# Patient Record
Sex: MALE | Race: BLACK OR AFRICAN AMERICAN | NOT HISPANIC OR LATINO | Employment: FULL TIME | URBAN - METROPOLITAN AREA
[De-identification: names, ages, dates, MRNs, and addresses within clinical notes are randomized per-mention and may not be internally consistent; named-entity substitution may affect disease eponyms.]

---

## 2017-10-20 ENCOUNTER — HISTORICAL (OUTPATIENT)
Dept: CARDIOLOGY | Facility: HOSPITAL | Age: 47
End: 2017-10-20

## 2018-03-29 ENCOUNTER — HISTORICAL (OUTPATIENT)
Dept: ADMINISTRATIVE | Facility: HOSPITAL | Age: 48
End: 2018-03-29

## 2018-03-29 LAB
ABS NEUT (OLG): 1.7
ALBUMIN SERPL-MCNC: 4.4 GM/DL (ref 3.4–5)
ALBUMIN/GLOB SERPL: 1.47 {RATIO} (ref 1.5–2.5)
ALP SERPL-CCNC: 37 UNIT/L (ref 38–126)
ALT SERPL-CCNC: 20 UNIT/L (ref 7–52)
APPEARANCE, UA: CLEAR
AST SERPL-CCNC: 30 UNIT/L (ref 15–37)
BACTERIA #/AREA URNS AUTO: ABNORMAL /HPF
BILIRUB SERPL-MCNC: 0.8 MG/DL (ref 0.2–1)
BILIRUB UR QL STRIP: NEGATIVE MG/DL
BILIRUBIN DIRECT+TOT PNL SERPL-MCNC: 0.2 MG/DL (ref 0–0.5)
BUN SERPL-MCNC: 17 MG/DL (ref 7–18)
CALCIUM SERPL-MCNC: 8.8 MG/DL (ref 8.5–10)
CHLORIDE SERPL-SCNC: 106 MMOL/L (ref 98–107)
CHOLEST SERPL-MCNC: 121 MG/DL (ref 0–200)
CHOLEST/HDLC SERPL: 2.6 {RATIO}
CO2 SERPL-SCNC: 26 MMOL/L (ref 21–32)
COLOR UR: YELLOW
CREAT SERPL-MCNC: 1.07 MG/DL (ref 0.6–1.3)
CREAT/UREA NIT SERPL: 15.9
ERYTHROCYTE [DISTWIDTH] IN BLOOD BY AUTOMATED COUNT: 13.4 % (ref 11.5–17)
EST. AVERAGE GLUCOSE BLD GHB EST-MCNC: 120 MG/DL
GGT SERPL-CCNC: 14 UNIT/L (ref 5–85)
GLOBULIN SER-MCNC: 3 GM/DL (ref 1.2–3)
GLUCOSE (UA): NEGATIVE MG/DL
GLUCOSE SERPL-MCNC: 104 MG/DL (ref 74–106)
HBA1C MFR BLD: 5.8 % (ref 4.4–6.4)
HCT VFR BLD AUTO: 47.1 % (ref 42–52)
HDLC SERPL-MCNC: 46 MG/DL (ref 35–60)
HGB BLD-MCNC: 15.4 GM/DL (ref 14–18)
HGB UR QL STRIP: ABNORMAL UNIT/L
KETONES UR QL STRIP: NEGATIVE MG/DL
LDH SERPL-CCNC: 229 UNIT/L (ref 140–271)
LDLC SERPL CALC-MCNC: 56 MG/DL (ref 0–129)
LEUKOCYTE ESTERASE UR QL STRIP: NEGATIVE UNIT/L
LYMPHOCYTES # BLD AUTO: 2.3 X10(3)/MCL (ref 0.6–3.4)
LYMPHOCYTES NFR BLD AUTO: 49.3 % (ref 13–40)
MCH RBC QN AUTO: 30 PG (ref 27–31.2)
MCHC RBC AUTO-ENTMCNC: 33 GM/DL (ref 32–36)
MCV RBC AUTO: 92 FL (ref 80–94)
MONOCYTES # BLD AUTO: 0.6 X10(3)/MCL (ref 0–1.8)
MONOCYTES NFR BLD AUTO: 13.1 % (ref 0.1–24)
NEUTROPHILS NFR BLD AUTO: 37.6 % (ref 47–80)
NITRITE UR QL STRIP.AUTO: NEGATIVE
PH UR STRIP: 5 [PH]
PLATELET # BLD AUTO: 217 X10(3)/MCL (ref 130–400)
PMV BLD AUTO: 8.2 FL
POTASSIUM SERPL-SCNC: 4.2 MMOL/L (ref 3.5–5.1)
PROT SERPL-MCNC: 7.4 GM/DL (ref 6.4–8.2)
PROT UR QL STRIP: NEGATIVE MG/DL
PSA SERPL-MCNC: 1.37 NG/ML (ref 0–3.5)
RBC # BLD AUTO: 5.13 X10(6)/MCL (ref 4.7–6.1)
RBC #/AREA URNS HPF: ABNORMAL /HPF
SODIUM SERPL-SCNC: 137 MMOL/L (ref 136–145)
SP GR UR STRIP: 1.02
SQUAMOUS EPITHELIAL, UA: ABNORMAL /LPF
TRIGL SERPL-MCNC: 63 MG/DL (ref 30–150)
UROBILINOGEN UR STRIP-ACNC: 0.2 MG/DL
VLDLC SERPL CALC-MCNC: 12.6 MG/DL
WBC # SPEC AUTO: 4.6 X10(3)/MCL (ref 4.5–11.5)
WBC #/AREA URNS AUTO: ABNORMAL /[HPF]

## 2019-01-16 ENCOUNTER — HISTORICAL (OUTPATIENT)
Dept: ADMINISTRATIVE | Facility: HOSPITAL | Age: 49
End: 2019-01-16

## 2019-01-16 LAB
EST. AVERAGE GLUCOSE BLD GHB EST-MCNC: 117 MG/DL
HBA1C MFR BLD: 5.7 % (ref 4.4–6.4)

## 2019-08-18 ENCOUNTER — HOSPITAL ENCOUNTER (EMERGENCY)
Dept: HOSPITAL 57 - BURERS | Age: 49
Discharge: HOME | End: 2019-08-18
Payer: SELF-PAY

## 2019-08-18 DIAGNOSIS — N39.0: Primary | ICD-10-CM

## 2019-08-18 LAB
BACTERIA UR QL AUTO: (no result) HPF
PROT UR STRIP.AUTO-MCNC: 100 MG/DL
WBC UR QL AUTO: (no result) HPF (ref 0–3)

## 2019-08-18 PROCEDURE — 87186 SC STD MICRODIL/AGAR DIL: CPT

## 2019-08-18 PROCEDURE — 99283 EMERGENCY DEPT VISIT LOW MDM: CPT

## 2019-08-18 PROCEDURE — 81015 MICROSCOPIC EXAM OF URINE: CPT

## 2019-08-18 PROCEDURE — 87077 CULTURE AEROBIC IDENTIFY: CPT

## 2019-08-18 PROCEDURE — 87591 N.GONORRHOEAE DNA AMP PROB: CPT

## 2019-08-18 PROCEDURE — 87086 URINE CULTURE/COLONY COUNT: CPT

## 2019-08-18 PROCEDURE — 87491 CHLMYD TRACH DNA AMP PROBE: CPT

## 2019-08-18 PROCEDURE — 81003 URINALYSIS AUTO W/O SCOPE: CPT

## 2019-10-21 ENCOUNTER — HISTORICAL (OUTPATIENT)
Dept: ADMINISTRATIVE | Facility: HOSPITAL | Age: 49
End: 2019-10-21

## 2019-10-21 LAB
ALBUMIN SERPL-MCNC: 4.4 GM/DL (ref 3.4–5)
ALBUMIN/GLOB SERPL: 1.29 {RATIO} (ref 1.5–2.5)
ALP SERPL-CCNC: 37 UNIT/L (ref 38–126)
ALT SERPL-CCNC: 22 UNIT/L (ref 7–52)
AST SERPL-CCNC: 32 UNIT/L (ref 15–37)
BILIRUB SERPL-MCNC: 0.8 MG/DL (ref 0.2–1)
BILIRUBIN DIRECT+TOT PNL SERPL-MCNC: 0.2 MG/DL (ref 0–0.5)
BILIRUBIN DIRECT+TOT PNL SERPL-MCNC: 0.6 MG/DL
BUN SERPL-MCNC: 16 MG/DL (ref 7–18)
CALCIUM SERPL-MCNC: 9.2 MG/DL (ref 8.5–10)
CHLORIDE SERPL-SCNC: 104 MMOL/L (ref 98–107)
CHOLEST SERPL-MCNC: 156 MG/DL (ref 0–200)
CHOLEST/HDLC SERPL: 3.3 {RATIO}
CO2 SERPL-SCNC: 27 MMOL/L (ref 21–32)
CREAT SERPL-MCNC: 1.01 MG/DL (ref 0.6–1.3)
GLOBULIN SER-MCNC: 3.4 GM/DL (ref 1.2–3)
GLUCOSE SERPL-MCNC: 111 MG/DL (ref 74–106)
HDLC SERPL-MCNC: 47 MG/DL (ref 35–60)
LDLC SERPL CALC-MCNC: 87 MG/DL (ref 0–129)
POTASSIUM SERPL-SCNC: 4.6 MMOL/L (ref 3.5–5.1)
PROT SERPL-MCNC: 7.8 GM/DL (ref 6.4–8.2)
SODIUM SERPL-SCNC: 138 MMOL/L (ref 136–145)
TRIGL SERPL-MCNC: 80 MG/DL (ref 30–150)
VLDLC SERPL CALC-MCNC: 16 MG/DL

## 2020-06-03 ENCOUNTER — HISTORICAL (OUTPATIENT)
Dept: ADMINISTRATIVE | Facility: HOSPITAL | Age: 50
End: 2020-06-03

## 2020-06-03 LAB
ALBUMIN SERPL-MCNC: 4.4 GM/DL (ref 3.4–5)
ALBUMIN/GLOB SERPL: 1.29 {RATIO} (ref 1.5–2.5)
ALP SERPL-CCNC: 41 UNIT/L (ref 38–126)
ALT SERPL-CCNC: 22 UNIT/L (ref 7–52)
AST SERPL-CCNC: 32 UNIT/L (ref 15–37)
BILIRUB SERPL-MCNC: 0.8 MG/DL (ref 0.2–1)
BILIRUBIN DIRECT+TOT PNL SERPL-MCNC: 0.2 MG/DL (ref 0–0.5)
BILIRUBIN DIRECT+TOT PNL SERPL-MCNC: 0.6 MG/DL
BUN SERPL-MCNC: 13 MG/DL (ref 7–18)
CALCIUM SERPL-MCNC: 9.2 MG/DL (ref 8.5–10)
CHLORIDE SERPL-SCNC: 107 MMOL/L (ref 98–107)
CHOLEST SERPL-MCNC: 134 MG/DL (ref 0–200)
CHOLEST/HDLC SERPL: 3 {RATIO}
CO2 SERPL-SCNC: 29 MMOL/L (ref 21–32)
CREAT SERPL-MCNC: 1.1 MG/DL (ref 0.6–1.3)
CREAT UR-MCNC: 300 MG/DL
EST. AVERAGE GLUCOSE BLD GHB EST-MCNC: 120 MG/DL
GLOBULIN SER-MCNC: 3.4 GM/DL (ref 1.2–3)
GLUCOSE SERPL-MCNC: 95 MG/DL (ref 74–106)
HBA1C MFR BLD: 5.8 % (ref 4.4–6.4)
HDLC SERPL-MCNC: 44 MG/DL (ref 35–60)
LDLC SERPL CALC-MCNC: 75 MG/DL (ref 0–129)
MICROALBUMIN UR-MCNC: 30 MG/L
MICROALBUMIN/CREAT RATIO PNL UR: <30 MG/GM
POTASSIUM SERPL-SCNC: 4.1 MMOL/L (ref 3.5–5.1)
PROT SERPL-MCNC: 7.8 GM/DL (ref 6.4–8.2)
SODIUM SERPL-SCNC: 141 MMOL/L (ref 136–145)
TRIGL SERPL-MCNC: 80 MG/DL (ref 30–150)
VLDLC SERPL CALC-MCNC: 16 MG/DL

## 2021-02-01 ENCOUNTER — HISTORICAL (OUTPATIENT)
Dept: ADMINISTRATIVE | Facility: HOSPITAL | Age: 51
End: 2021-02-01

## 2021-02-01 LAB
ABS NEUT (OLG): 2.1 X10(3)/MCL (ref 2.1–9.2)
ALBUMIN SERPL-MCNC: 4.4 GM/DL (ref 3.4–5)
ALBUMIN/GLOB SERPL: 1.47 {RATIO} (ref 1.5–2.5)
ALP SERPL-CCNC: 40 UNIT/L (ref 38–126)
ALT SERPL-CCNC: 17 UNIT/L (ref 7–52)
APPEARANCE, UA: CLEAR
AST SERPL-CCNC: 25 UNIT/L (ref 15–37)
BACTERIA #/AREA URNS AUTO: ABNORMAL /HPF
BILIRUB SERPL-MCNC: 0.6 MG/DL (ref 0.2–1)
BILIRUB UR QL STRIP: NEGATIVE MG/DL
BILIRUBIN DIRECT+TOT PNL SERPL-MCNC: 0.2 MG/DL (ref 0–0.5)
BILIRUBIN DIRECT+TOT PNL SERPL-MCNC: 0.4 MG/DL
BUN SERPL-MCNC: 14 MG/DL (ref 7–18)
CALCIUM SERPL-MCNC: 9 MG/DL (ref 8.5–10.1)
CHLORIDE SERPL-SCNC: 106 MMOL/L (ref 98–107)
CHOLEST SERPL-MCNC: 110 MG/DL (ref 0–200)
CHOLEST/HDLC SERPL: 3.1 {RATIO}
CO2 SERPL-SCNC: 27 MMOL/L (ref 21–32)
COLOR UR: YELLOW
CREAT SERPL-MCNC: 1 MG/DL (ref 0.6–1.3)
CREAT UR-MCNC: 200 MG/DL
DEPRECATED CALCIDIOL+CALCIFEROL SERPL-MC: 13.9 NG/ML (ref 30–80)
ERYTHROCYTE [DISTWIDTH] IN BLOOD BY AUTOMATED COUNT: 14.2 % (ref 11.5–17)
EST. AVERAGE GLUCOSE BLD GHB EST-MCNC: 117 MG/DL
GLOBULIN SER-MCNC: 3 GM/DL (ref 1.2–3)
GLUCOSE (UA): NEGATIVE MG/DL
GLUCOSE SERPL-MCNC: 104 MG/DL (ref 74–106)
HBA1C MFR BLD: 5.7 % (ref 4.4–6.4)
HCT VFR BLD AUTO: 43.3 % (ref 42–52)
HDLC SERPL-MCNC: 36 MG/DL (ref 35–60)
HGB BLD-MCNC: 14.6 GM/DL (ref 14–18)
HGB UR QL STRIP: ABNORMAL UNIT/L
KETONES UR QL STRIP: NEGATIVE MG/DL
LDLC SERPL CALC-MCNC: 60 MG/DL (ref 0–129)
LEUKOCYTE ESTERASE UR QL STRIP: NEGATIVE UNIT/L
LYMPHOCYTES # BLD AUTO: 2.2 X10(3)/MCL (ref 0.6–3.4)
LYMPHOCYTES NFR BLD AUTO: 47.4 % (ref 13–40)
MCH RBC QN AUTO: 30.8 PG (ref 27–31.2)
MCHC RBC AUTO-ENTMCNC: 34 GM/DL (ref 32–36)
MCV RBC AUTO: 91 FL (ref 80–94)
MICROALBUMIN UR-MCNC: 30 MG/L
MICROALBUMIN/CREAT RATIO PNL UR: <30 MG/GM
MONOCYTES # BLD AUTO: 0.4 X10(3)/MCL (ref 0.1–1.3)
MONOCYTES NFR BLD AUTO: 8.7 % (ref 0.1–24)
NEUTROPHILS NFR BLD AUTO: 43.9 % (ref 47–80)
NITRITE UR QL STRIP.AUTO: NEGATIVE
PH UR STRIP: 5.5 [PH]
PLATELET # BLD AUTO: 244 X10(3)/MCL (ref 130–400)
PMV BLD AUTO: 8.7 FL (ref 9.4–12.4)
POTASSIUM SERPL-SCNC: 4.6 MMOL/L (ref 3.5–5.1)
PROT SERPL-MCNC: 7.4 GM/DL (ref 6.4–8.2)
PROT UR QL STRIP: NEGATIVE MG/DL
PSA SERPL-MCNC: 1.02 NG/ML (ref 0–3.5)
RBC # BLD AUTO: 4.74 X10(6)/MCL (ref 4.7–6.1)
RBC #/AREA URNS HPF: ABNORMAL /HPF
SODIUM SERPL-SCNC: 139 MMOL/L (ref 136–145)
SP GR UR STRIP: >1.03
SQUAMOUS EPITHELIAL, UA: ABNORMAL /LPF
TRIGL SERPL-MCNC: 62 MG/DL (ref 30–150)
TSH SERPL-ACNC: 1.1 MIU/ML (ref 0.35–4.94)
UROBILINOGEN UR STRIP-ACNC: 0.2 MG/DL
VLDLC SERPL CALC-MCNC: 12.4 MG/DL
WBC # SPEC AUTO: 4.7 X10(3)/MCL (ref 4.5–11.5)
WBC #/AREA URNS AUTO: ABNORMAL /[HPF]

## 2021-10-18 ENCOUNTER — HISTORICAL (OUTPATIENT)
Dept: ADMINISTRATIVE | Facility: HOSPITAL | Age: 51
End: 2021-10-18

## 2021-10-18 LAB
ALBUMIN SERPL-MCNC: 4.3 GM/DL (ref 3.4–5)
ALBUMIN/GLOB SERPL: 1.79 {RATIO} (ref 1.5–2.5)
ALP SERPL-CCNC: 39 UNIT/L (ref 38–126)
ALT SERPL-CCNC: 17 UNIT/L (ref 7–52)
AST SERPL-CCNC: 28 UNIT/L (ref 15–37)
BILIRUB SERPL-MCNC: 0.6 MG/DL (ref 0.2–1)
BUN SERPL-MCNC: 16 MG/DL (ref 7–18)
CALCIUM SERPL-MCNC: 9.2 MG/DL (ref 8.5–10.1)
CHLORIDE SERPL-SCNC: 104 MMOL/L (ref 98–107)
CHOLEST SERPL-MCNC: 145 MG/DL (ref 0–200)
CHOLEST/HDLC SERPL: 3 {RATIO}
CO2 SERPL-SCNC: 26 MMOL/L (ref 21–32)
CREAT SERPL-MCNC: 0.9 MG/DL (ref 0.6–1.3)
DEPRECATED CALCIDIOL+CALCIFEROL SERPL-MC: 24.2 NG/ML (ref 30–80)
EST CREAT CLEARANCE SER (OHS): 171.82 ML/MIN
EST. AVERAGE GLUCOSE BLD GHB EST-MCNC: 117 MG/DL
GLOBULIN SER-MCNC: 2.4 GM/DL (ref 1.2–3)
GLUCOSE SERPL-MCNC: 91 MG/DL (ref 74–106)
HBA1C MFR BLD: 5.7 % (ref 4.4–6.4)
HDLC SERPL-MCNC: 49 MG/DL (ref 35–60)
LDLC SERPL CALC-MCNC: 65 MG/DL (ref 0–129)
POTASSIUM SERPL-SCNC: 5 MMOL/L (ref 3.5–5.1)
PROT SERPL-MCNC: 6.7 GM/DL (ref 6.4–8.2)
SODIUM SERPL-SCNC: 138 MMOL/L (ref 136–145)
TRIGL SERPL-MCNC: 79 MG/DL (ref 30–150)
VLDLC SERPL CALC-MCNC: 15.8 MG/DL

## 2022-04-09 ENCOUNTER — HISTORICAL (OUTPATIENT)
Dept: ADMINISTRATIVE | Facility: HOSPITAL | Age: 52
End: 2022-04-09

## 2022-04-28 ENCOUNTER — HISTORICAL (OUTPATIENT)
Dept: ADMINISTRATIVE | Facility: HOSPITAL | Age: 52
End: 2022-04-28

## 2022-04-28 LAB — DEPRECATED CALCIDIOL+CALCIFEROL SERPL-MC: 20.6 NG/ML (ref 30–80)

## 2022-04-29 VITALS
SYSTOLIC BLOOD PRESSURE: 130 MMHG | DIASTOLIC BLOOD PRESSURE: 100 MMHG | WEIGHT: 275.81 LBS | HEIGHT: 73 IN | BODY MASS INDEX: 36.56 KG/M2 | WEIGHT: 272.5 LBS | BODY MASS INDEX: 36.11 KG/M2 | DIASTOLIC BLOOD PRESSURE: 88 MMHG | HEIGHT: 73 IN | SYSTOLIC BLOOD PRESSURE: 120 MMHG

## 2022-05-02 NOTE — HISTORICAL [facility] CERNER
This is a historical note converted from Georgie. Formatting and pictures may have been removed.  Please reference Georgie for original formatting and attached multimedia. Chief Complaint  CPX  History of Present Illness  Patient is here for ?his?annual wellness -?labs not done, fasting.?Denies any side effects to current medications.? Tolerating current medications and?feels that they are effective.? Denies change in social or family history.?  ?   HTN: He did not take his BP med last night. He occasionally checks his BP at home, and the diastolic is high sometimes.  ?   Patient presented to?[facility]?1/24/2021?with?sinusitis. ?He tested negative for Covid.? He was given a steroid injection and a prescription?of Augmentin.? After taking the medication for?3 to 4 days, he did not notice any improvement?so he stopped the medication.? He continues to have?sinus congestion and pressure?with a postnasal drip. Occasional headaches.  ?   He reports constipation. BM every 2-3 days. Stool is hard.  ?   Social Hx:??Working locally as a?.? Single, 6 children, 7 grands. No nicotine or etoh. No exercise.??Water:? 2-3 glasses/day,? Caff: occasional diet soda. He stopped his Sport Drinks one month ago.  ?   Family Hx:  Father dec 65 :? CAD, Dialysis  Mother 73 : enlarged heart, HTN  1 sister : HTN  2 brothers : both with HTN  ?  Surgical Hx:  Left Hand x3 procedures in 2018, Dr. Cottrell  ?   Specialists:  Card : Colin, recent stress test good per patient  Last Colonoscopy: no lifetime  ?   Vaccinations:  Influenza - declines  Shingles - +history of chicken pox as a child. He will return at a later date to receive due to him not feeling well today.  ?  Review of Systems  General:?? Patient reports energy level is ?fair. Denies weight change.??Denies fever,chills, night sweats, or weakness. ?Reports fatigue.  Integument:?? Denies any nevus changes, rashes, urticaria,??or sores.??Also denies itching or areas of  numbness.  HEENT:?? Denies vision changes or eye pain.??See HPI.  Cardiovascular:?? Denies chest pain, palpitations, dyspnea on exertion, orthopnea.  Respiratory:?? No cough, wheezing, shortness of breath, or sputum.  GI:?? Denies nausea, emesis, diarrhea, melena, hematochezia or abdominal pain  :?? No frequency, urgency, hematuria, discharge, or incontinence  MS:?? Denies myalgias, arthralgias, joint effusion, edema, or weakness  Neuro:?? No numbness in extremities, tingling, dizziness, or weakness  Psych:?? Denies anxiety, depression, suicidal or homicidal ideations, or irritability  Endo:?? Denies polyuria, polydipsia, polyphagia  Heme:?? No abnormal bleeding or bruising. No lymph node enlargement or pain.  ?  Physical Exam  Vitals & Measurements  HR:?73(Peripheral)? BP:?136/90?  HT:?185?cm? HT:?185.00?cm? WT:?126.3?kg? WT:?126.300?kg? BMI:?36.9?  General:?? Well-developed and??nourished, no apparent distress, alert and oriented??4.  Integument:?? Skin is intact with no erythema.??No pustules or vesicles.??No rash or scale. No Lymphadenopathy.??No urticaria.??No abnormal nevi.  HEENT:?? PERRLA, EOMI ; Bilateral EAC cerumen impaction. Turbinates enlarged and swollen;?+ erythema of pharynx with PND.  Neck:??Supple, no lymphadenopathy, no thyromegaly, no bruits, no jugular venous distention.  Cardiovascular:?? Regular rhythm and rate, no murmurs, radial and dorsal pedal pulses 2+ bilaterally.  Respiratory:?? Lungs clear to auscultation bilaterally, no wheezes, no crackles, no rhonchi.??Good air movement.  Abdomen:?? NABS, soft, nontender, no hepatosplenomegaly, no masses, no guarding or rebound.?  MS:?? No?CCE.  Neuro:?? CN II-XII intact_  Psych: Normal affect, patient calm, good historian, patient answers questions?appropriately. Good hygiene.  Heme:? No bruising or petechiae.  Assessment/Plan  1.?Wellness examination?Z00.00  ?Age appropriate wellness topics discussed. He?was encouraged to increase his water  intake and exercise (cardio and weight resistance).  Lab today: CBC, CMP, lipid panel, UA  Ordered:  CBC w/ Auto Diff, Routine collect, 02/01/21 9:13:00 CST, Blood, Order for future visit, Stop date 02/01/21 9:13:00 CST, Lab Collect, Wellness examination, 02/01/21 9:13:00 CST  Comprehensive Metabolic Panel, Routine collect, 02/01/21 9:13:00 CST, Blood, Order for future visit, Stop date 02/01/21 9:13:00 CST, Lab Collect, Wellness examination, 02/01/21 9:13:00 CST  Lab Collection Request, 02/01/21 9:13:00 CST, [facility] - [facility], 02/01/21 9:13:00 CST, Fatigue  Prediabetes  Prostate cancer screening  Wellness examination  Lipid Panel, Routine collect, 02/01/21 9:13:00 CST, Blood, Order for future visit, Stop date 02/01/21 9:13:00 CST, Lab Collect, Wellness examination, 02/01/21 9:13:00 CST  Preventative Health Care Est 40-64 years [id number] PC, Wellness examination, [facility] - [facility], 02/01/21 9:13:00 CST  Urinalysis no Reflex, Routine collect, Urine, Order for future visit, 02/01/21 9:13:00 CST, Stop date 02/01/21 9:13:00 CST, Nurse collect, Wellness examination  ?  2.?Colon cancer screening?Z12.11  ?Screening colonoscopy ordered.  Ordered:  External Referral, Colon Ca Screening, GI, 02/01/21 9:00:00 CST, Colon cancer screening  ?  3.?Prostate cancer screening?Z12.5  ?Lab today: PSA  Ordered:  Lab Collection Request, 02/01/21 9:13:00 CST, [facility] - [facility], 02/01/21 9:13:00 CST, Fatigue  Prediabetes  Prostate cancer screening  Wellness examination  Prostate Specific Antigen, Routine collect, 02/01/21 9:13:00 CST, Blood, Order for future visit, Stop date 02/01/21 9:13:00 CST, Lab Collect, Prostate cancer screening, 02/01/21 9:13:00 CST  ?  4.?Hypercholesteremia?E78.00  ?Lab today:?Lipid panel and CMP  Crestor 5 mg refilled #90x1  Ordered:  Clinic Follow up, *Est. 08/01/21 3:00:00 CDT, 6 month FU, 6 month FU, Order for future visit, Hypercholesteremia  Hypertension  Prediabetes, [facility]  ?  5.?Sinusitis?J32.9  ?Omnicef  300 mg every 12 hours?x10 days #20x0.  Astelin nasal spray?#1x0.  If symptoms fail to improve, he will notify us.  Ordered:  azelastine nasal, See Instructions, 1-2 spray(s) Nasal BID, # 1 EA, 0 Refill(s), Pharmacy: [id number], 185, cm, Height/Length Dosing, 02/01/21 8:30:00 CST, 126.3, kg, Weight Dosing, 02/01/21 8:30:00 CST  cefdinir, 300 mg = 1 cap(s), Oral, q12hr, X 10 day(s), # 20 cap(s), 0 Refill(s), Pharmacy: [id number], 185, cm, Height/Length Dosing, 02/01/21 8:30:00 CST, 126.3, kg, Weight Dosing, 02/01/21 8:30:00 CST  ?  6.?Hypertension?I10  ?Patient will check his blood pressure twice a day?and notify us if his blood pressure remains greater than 140/90.  He will also return to the office?in the morning after?taking his blood pressure medication the night before.  Labs today: CMP  Amlodipine 10 mg and valsartan 160 mg?refilled #90x1  Ordered:  amLODIPine, 10 mg = 1 tab(s), Oral, Daily, # 90 tab(s), 1 Refill(s), Pharmacy: [id number], 185, cm, Height/Length Dosing, 02/01/21 8:30:00 CST, 126.3, kg, Weight Dosing, 02/01/21 8:30:00 CST  valsartan, 160 mg = 1 tab(s), Oral, Daily, # 90 tab(s), 1 Refill(s), Pharmacy: [id number], 185, cm, Height/Length Dosing, 02/01/21 8:30:00 CST, 126.3, kg, Weight Dosing, 02/01/21 8:30:00 CST  Clinic Follow up, *Est. 08/01/21 3:00:00 CDT, 6 month FU, 6 month FU, Order for future visit, Hypercholesteremia  Hypertension  Prediabetes, [facility]  Office/Outpatient Visit Level 2 Established 33628 PC, Hypertension  OA (osteoarthritis)  Fatigue, [facility] - [facility], 02/01/21 9:13:00 CST  ?  7.?Prediabetes?R73.03  ?Lab today: A1c and MA  Ordered:  Clinic Follow up, *Est. 08/01/21 3:00:00 CDT, 6 month FU, 6 month FU, Order for future visit, Hypercholesteremia  Hypertension  Prediabetes, [facility]  Hemoglobin A1c, Routine collect, 02/01/21 9:13:00 CST, Blood, Order for future visit, Stop date 02/01/21 9:13:00 CST, Lab Collect, Prediabetes,  02/01/21 9:13:00 CST  Lab Collection Request, 02/01/21 9:13:00 CST, [facility] - [facility], 02/01/21 9:13:00 CST, Fatigue  Prediabetes  Prostate cancer screening  Wellness examination  Microalbumin Urine, Routine collect, Urine, Order for future visit, 02/01/21 9:13:00 CST, Stop date 02/01/21 9:13:00 CST, Nurse collect, Prediabetes  ?  8.?Obesity?E66.9  ?We discussed the importance of weight loss and the disease processes associated with?this problem. ?He was encouraged to follow a high-fiber diet?(25 to 35 g/day).? Low carbohydrate and sugar diet?with his history of prediabetes.? Low salt diet with his history of?hypertension.  ?  9.?Fatigue?R53.83  ?Lab today: Vitamin D, TSH  Ordered:  Lab Collection Request, 02/01/21 9:13:00 CST, [facility] - [facility], 02/01/21 9:13:00 CST, Fatigue  Prediabetes  Prostate cancer screening  Wellness examination  Office/Outpatient Visit Level 2 Established 17964 PC, Hypertension  OA (osteoarthritis)  Fatigue, [facility] - [facility], 02/01/21 9:13:00 CST  Thyroid Stimulating Hormone, Routine collect, 02/01/21 9:13:00 CST, Blood, Order for future visit, Stop date 02/01/21 9:13:00 CST, Lab Collect, Fatigue, 02/01/21 9:13:00 CST  Vitamin D, 25-Hydroxy Level, Routine collect, 02/01/21 9:13:00 CST, Blood, Order for future visit, Stop date 02/01/21 9:13:00 CST, Lab Collect, Fatigue, 02/01/21 9:13:00 CST  ?  10.?Bilateral impacted cerumen?H61.23  ?Ear wax softener drops daily for 2 weeks. If no improvement, he will schedule an OV for cerumen removal.  ?  11.?Constipation?K59.00  Increase water intake.?Increase fiber intake.  ?  12.?OA (osteoarthritis)?M19.90  ?Diclofenac gel?#100 g x 0  Ordered:  diclofenac topical, 1 mary kate, TOP, BID, # 100 gm, 0 Refill(s), Pharmacy: [id number], 185, cm, Height/Length Dosing, 02/01/21 8:30:00 CST, 126.3, kg, Weight Dosing, 02/01/21 8:30:00 CST  Office/Outpatient Visit Level 2 Established 12229 PC, Hypertension  OA (osteoarthritis)  Fatigue, [facility] - [facility],  02/01/21 9:13:00 CST  ?  Orders:  rosuvastatin, See Instructions, Take 1 tablet by mouth once daily, # 90 tab(s), 1 Refill(s), Pharmacy: [id number], 185, cm, Height/Length Dosing, 02/01/21 8:30:00 CST, 126.3, kg, Weight Dosing, 02/01/21 8:30:00 CST  Referrals  External Referral, Colon Ca Screening, GI, 02/01/21 9:00:00 CST, Colon cancer screening  Clinic Follow up, *Est. 08/01/21 3:00:00 CDT, 6 month FU, 6 month FU, Order for future visit, Hypercholesteremia  Hypertension  Prediabetes, [facility]   Problem List/Past Medical History  Ongoing  Allergic rhinitis  Hypercholesteremia  Hypertension  No-show for appointment  Obesity  Prediabetes  Prostate cancer screening  Vertigo  Wellness examination  Historical  HTN (hypertension)  PAD - Peripheral arterial disease  Procedure/Surgical History  Catheter placement in coronary artery(s) for coronary angiography, including intraprocedural injection(s) for coronary angiography, imaging supervision and interpretation; with left heart catheterization including intraprocedural injection(s) for left cass (10/20/2017)  Fluoroscopy of Multiple Coronary Arteries using Low Osmolar Contrast (10/20/2017)  Measurement of Cardiac Sampling and Pressure, Left Heart, Percutaneous Approach (10/20/2017)  Drainage of scrotal wall abscess (03/27/2015)  Incision and drainage of scrotum and tunica vaginalis (03/27/2015)  CLOSURE OF SKIN AND SUBCUTANEOUS TISSUE OTHER SITES (02/24/2013)  Simple repair of superficial wounds of scalp, neck, axillae, external genitalia, trunk and/or extremities (including hands and feet); 2.5 cm or less. (02/24/2013)  hand surgery   Medications  amlodipine 10 mg oral tablet, 10 mg= 1 tab(s), Oral, Daily, 1 refills  aspirin 81 mg oral tablet, CHEWABLE, 81 mg= 1 tab(s), Oral, Daily  Astelin 137 mcg/inh nasal spray, See Instructions  diclofenac 3% topical gel, 1 mary kate, TOP, BID  Omnicef 300 mg oral capsule, 300 mg= 1 cap(s), Oral, q12hr  rosuvastatin 5 mg oral  tablet, See Instructions, 1 refills  Toradol, 60 mg= 2 mL, IM, Once  valsartan 160 mg oral tablet, 160 mg= 1 tab(s), Oral, Daily, 1 refills  Zyrtec 10 mg oral tablet, 10 mg= 1 tab(s), Oral, Daily  Allergies  No Known Allergies  Social History  Abuse/Neglect  No, 02/01/2021  No, 06/03/2020  No, 01/04/2020  Alcohol - Denies Alcohol Use, 11/19/2012  Past, 01/04/2020  Employment/School  Employed, Work/School description: ., 10/21/2019  Substance Use - Denies Substance Abuse, 11/22/2012  Never, 01/04/2020  Tobacco - Denies Tobacco Use, 07/30/2012  Never (less than 100 in lifetime), No, 02/01/2021  Never (less than 100 in lifetime), No, 06/03/2020  Never (less than 100 in lifetime), No, 01/04/2020  Immunizations  Vaccine Date Status   diphtheria-tetanus toxoids 03/01/2013 Recorded   Health Maintenance  Health Maintenance  ???Pending?(in the next year)  ??? ??OverDue  ??? ? ? ?Aspirin Therapy for CVD Prevention due??02/20/15??and every 1??year(s)  ??? ? ? ?Influenza Vaccine due??10/01/20??and every 1??day(s)  ??? ??Due?  ??? ? ? ?Alcohol Misuse Screening due??01/02/21??and every 1??year(s)  ??? ? ? ?ADL Screening due??02/01/21??and every 1??year(s)  ??? ? ? ?Colorectal Screening due??02/01/21??Unknown Frequency  ??? ? ? ?Depression Screening due??02/01/21??Unknown Frequency  ??? ? ? ?Hypertension Management-Education due??02/01/21??and every 1??year(s)  ??? ? ? ?Zoster Vaccine due??02/01/21??Unknown Frequency  ??? ??Due In Future?  ??? ? ? ?Diabetes Screening not due until??06/03/21??and every 1??year(s)  ??? ? ? ?Hypertension Management-BMP not due until??06/03/21??and every 1??year(s)  ??? ? ? ?Obesity Screening not due until??01/01/22??and every 1??year(s)  ???Satisfied?(in the past 1 year)  ??? ??Satisfied?  ??? ? ? ?Blood Pressure Screening on??02/01/21.??Satisfied by Fallon Barnes NP  ??? ? ? ?Body Mass Index Check on??02/01/21.??Satisfied by Johan Gilliam  ??? ? ? ?Coronary Artery Disease  Maintenance-Lipid Lowering Therapy on??02/01/21.??Satisfied by Fallon Barnes NP  ??? ? ? ?Diabetes Screening on??06/03/20.??Satisfied by Mckayla Barnes  ??? ? ? ?Hypertension Management-Blood Pressure on??02/01/21.??Satisfied by Fallon Barnes NP  ??? ? ? ?Influenza Vaccine on??02/01/21.??Satisfied by Johan Gilliam  ??? ? ? ?Lipid Screening on??06/03/20.??Satisfied by Mckayla Barnes  ??? ? ? ?Obesity Screening on??02/01/21.??Satisfied by Johan Gilliam  ?      Patient condition discussed?in detail with nurse practitioner.??Agree with plan of care?and follow-up.  ?

## 2022-05-02 NOTE — HISTORICAL [facility] CERNER
This is a historical note converted from Georgie. Formatting and pictures may have been removed.  Please reference Georgie for original formatting and attached multimedia. Chief Complaint  6 month rc  History of Present Illness  Patient here for six-month follow-up.? Denies any side effects to current medications and?feels that they are effective.? Denies change in social or family history.? No new complaints.  Took his BP med last night but prior to that he had been out for about 4 days.? Denies HA.  Desires PSA, wasnt able to test after his CPX last year.  ?   CARD : Colin.  Review of Systems  General:??????????????? Patient reports energy level is good.??Denies fever,chills, night sweats, fatigue or weakness.  HEENT:?????????????????? Denies vision changes or eye pain.? No sore throat, ear pain, sinus pressure or discharge.  Cardiovascular:??? Denies chest pain, palpitations, dyspnea on exertion, orthopnea.  Respiratory:???????? No cough, wheezing, shortness of breath, or sputum.  GI:????????????????????????? Denies nausea, emesis, constipation, diarrhea, melena, hematochezia or abdominal pain  :??????????????????????? No frequency, urgency, hematuria, discharge, or incontinence  MS:?????????????????????pain in?knees and elbows. No joint effusion. ? Denies myalgias, arthralgias, joint effusion, edema, or weakness  Neuro:????????????????? No headaches, numbness in extremities, tingling, dizziness, or weakness  Psych:?????????????????? Denies anxiety, depression, suicidal or homicidal ideations, or irritability  Endo:??????????????????? Denies polyuria, polydipsia, polyphagia  Heme:????????????????? No abnormal bleeding or bruising. No lymph node enlargement or pain.  Physical Exam  Vitals & Measurements  HR:?84(Peripheral)? BP:?130/100?  HT:?185?cm? WT:?123.6?kg? BMI:?36.11?  130/94 on recheck.  General :?????Well-developed and? nourished, no apparent distress, alert and oriented ?4  HEENT:????? TMs and EACs within  normal limits,?nasal mucosa within normal limits?with no discharge,?oropharynx clear  Integument :????? Skin is intact with no erythema.? No pustules or vesicles.? No rash or scale. No Lymphadenopathy.? No urticaria.? No abnormal nevi  Neck :?????Supple, no lymphadenopathy, no thyromegaly, no bruits, no jugular venous distention  Cardiovascular :?????? Regular rhythm and rate, no murmurs, radial and dorsal pedal pulses 2+ bilaterally  Respiratory :??????Lungs clear to auscultation bilaterally, no wheezes, no crackles, no rhonchi.? Good air movement  Abdomen :?????? ?NABS, soft, nontender, no hepatosplenomegaly, no masses, no guarding or rebound????????????????????????  Ext:??????? No muscle tenderness, joints WNL, FROM, negative SLR, no?CCE  Neuro :? ?????? CN II-XII intact, reflexes 2+ throughout, no motor sensory deficits, negative?cerebellar? tests  Heme :?????? No bruising or petechiae?  Back:??????? no CVAT  Assessment/Plan  1.?Hypertension?I10  ?Refill amlodipine 10 mg?and?valsartan 160 mg. ?He is instructed to monitor his blood pressure at home and contact us if it is consistently greater than 140/90.? He is also to call us if he has any?symptoms?like headache or dizziness.  2.?Hypercholesteremia?E78.00  ?We will check CMP and lipids.? Refilled rosuvastatin for 6 months.  3.?Prediabetes?R73.03  ?We will check A1c and microalbumin  4.?Obesity?E66.9  ?Longer?term adverse consequences of obesity discussed with patient at length.? He strongly encouraged to lose weight.  5.?GERD (gastroesophageal reflux disease)?K21.9  Prescribed Prevacid 30 mg for 6 months.  Referrals  Clinic Follow up, *Est. 12/09/20 8:30:00 CST, CPX in 6 months, Order for future visit, GERD (gastroesophageal reflux disease), [facility]  Clinic Follow-up PRN, 06/03/20 11:59:00 CDT, [facility] - [facility], Future Order   Problem List/Past Medical History  Ongoing  Allergic rhinitis  Hypercholesteremia  Hypertension  No-show for  appointment  Obesity  Prediabetes  Vertigo  Wellness examination  Historical  HTN (hypertension)  PAD - Peripheral arterial disease  Procedure/Surgical History  Catheter placement in coronary artery(s) for coronary angiography, including intraprocedural injection(s) for coronary angiography, imaging supervision and interpretation; with left heart catheterization including intraprocedural injection(s) for left cass (10/20/2017)  Fluoroscopy of Multiple Coronary Arteries using Low Osmolar Contrast (10/20/2017)  Measurement of Cardiac Sampling and Pressure, Left Heart, Percutaneous Approach (10/20/2017)  Drainage of scrotal wall abscess (03/27/2015)  Incision and drainage of scrotum and tunica vaginalis (03/27/2015)  CLOSURE OF SKIN AND SUBCUTANEOUS TISSUE OTHER SITES (02/24/2013)  Simple repair of superficial wounds of scalp, neck, axillae, external genitalia, trunk and/or extremities (including hands and feet); 2.5 cm or less. (02/24/2013)  hand surgery   Medications  amlodipine 10 mg oral tablet, 10 mg= 1 tab(s), Oral, Daily, 1 refills  aspirin 81 mg oral tablet, CHEWABLE, 81 mg= 1 tab(s), Oral, Daily  meclizine 25 mg oral tablet, 25 mg= 1 tab(s), Oral, QID, 1 refills  Prevacid 30 mg oral DR capsule, 30 mg= 1 cap(s), Oral, BID, 1 refills  rosuvastatin 5 mg oral tablet, 5 mg= 1 tab(s), Oral, Daily, 1 refills  Toradol, 60 mg= 2 mL, IM, Once  valsartan 160 mg oral tablet, 160 mg= 1 tab(s), Oral, Daily, 1 refills  Allergies  No Known Allergies  Social History  Abuse/Neglect  No, 06/03/2020  No, 01/04/2020  Alcohol - Denies Alcohol Use, 11/19/2012  Past, 01/04/2020  Employment/School  Employed, Work/School description: ., 10/21/2019  Substance Use - Denies Substance Abuse, 11/22/2012  Never, 01/04/2020  Tobacco - Denies Tobacco Use, 07/30/2012  Never (less than 100 in lifetime), No, 06/03/2020  Never (less than 100 in lifetime), No, 01/04/2020  Immunizations  Vaccine Date Status   diphtheria-tetanus toxoids  03/01/2013 Recorded   Health Maintenance  Health Maintenance  ???Pending?(in the next year)  ??? ??OverDue  ??? ? ? ?Diabetes Screening due??and every?  ??? ? ? ?Aspirin Therapy for CVD Prevention due??02/20/15??and every 1??year(s)  ??? ? ? ?Alcohol Misuse Screening due??01/01/20??and every 1??year(s)  ??? ??Due?  ??? ? ? ?ADL Screening due??06/12/20??and every 1??year(s)  ??? ? ? ?Depression Screening due??06/12/20??and every?  ??? ? ? ?Hypertension Management-Education due??06/12/20??and every 1??year(s)  ??? ??Due In Future?  ??? ? ? ?Obesity Screening not due until??01/01/21??and every 1??year(s)  ??? ? ? ?Blood Pressure Screening not due until??06/03/21??and every 1??year(s)  ??? ? ? ?Body Mass Index Check not due until??06/03/21??and every 1??year(s)  ??? ? ? ?Hypertension Management-Blood Pressure not due until??06/03/21??and every 1??year(s)  ??? ? ? ?Hypertension Management-BMP not due until??06/03/21??and every 1??year(s)  ???Satisfied?(in the past 1 year)  ??? ??Satisfied?  ??? ? ? ?Blood Pressure Screening on??06/03/20.??Satisfied by Price Trinh  ??? ? ? ?Body Mass Index Check on??06/03/20.??Satisfied by Price Trinh  ??? ? ? ?Coronary Artery Disease Maintenance-Lipid Lowering Therapy on??06/03/20.??Satisfied by Ayla Conklin ?PADMINI MARS  ??? ? ? ?Diabetes Screening on??06/03/20.??Satisfied by Mckayla Barnes  ??? ? ? ?Hypertension Management-BMP on??06/03/20.??Satisfied by Mckayla Barnes  ??? ? ? ?Influenza Vaccine on??10/21/19.??Satisfied by Loulou Falcon LPN  ??? ? ? ?Lipid Screening on??06/03/20.??Satisfied by Mckayla Barnes  ??? ? ? ?Obesity Screening on??06/03/20.??Satisfied by Price Trinh  ?

## 2022-05-02 NOTE — HISTORICAL [facility]ER
This is a historical note converted from Georgie. Formatting and pictures may have been removed.  Please reference Georgie for original formatting and attached multimedia. Chief Complaint  CPX (FASTING)  History of Present Illness  Patient presents for annual wellness exam.? Denies any change in social history or family history.? Reports to be tolerating prescribed medicines well. Denies any side effects.??  Working in [place], 30 and 7, as a?.? Single, 6 children, 7 grands. No nicotine or etoh. No exercise.  Father dec 65 :? CAD, Dialysis  Mother 72 : enlarged heart, HTN  1 sister : HTN  2 brothers : both with HTN  ?  Card : Colin  Review of Systems  General:??????????????? Patient reports energy level is good. Denies weight change. ?Denies fever,chills, night sweats, fatigue or weakness.  Integument:???????? Denies any nevus changes, rashes, urticaria, ?or sores.? Also denies itching or areas of numbness.  HEENT:?????????????????? Denies vision changes or eye pain.? No sore throat, ear pain, sinus pressure or discharge.  Cardiovascular:???missed doses of HTN meds this week.?  Denies chest pain, palpitations, dyspnea on exertion, orthopnea.  Respiratory:???????? No cough, wheezing, shortness of breath, or sputum.  GI:????????????????????????? Denies nausea, emesis, constipation, diarrhea, melena, hematochezia or abdominal pain  :??????????????????????? No frequency, urgency, hematuria, discharge, or incontinence  MS:?????????????????????? Denies myalgias, arthralgias, joint effusion, edema, or weakness  Neuro:????????????????? No headaches, numbness in extremities, tingling, dizziness, or weakness  Psych:?????????????????? Denies anxiety, depression, suicidal or homicidal ideations, or irritability  Endo:??????????????????? Denies polyuria, polydipsia, polyphagia  Heme:????????????????? No abnormal bleeding or bruising. No lymph node enlargement or pain.  Physical Exam  Vitals &  Measurements  HR:?75(Peripheral)? BP:?160/98?  HT:?185?cm? WT:?127.1?kg? BMI:?37.14?  General :?????Well-developed and? nourished, no apparent distress, alert and oriented ?4  Integument :????? Skin is intact with no erythema.? No pustules or vesicles.? No rash or scale. No Lymphadenopathy.? No urticaria.? No abnormal nevi  HEENT :?????VANESSA, EOMI ; TMs and EACs clear, normal turbinates with no erythema, normal mucosa, no sinus tenderness; no erythema or exudate of mouth or pharynx  Neck :?????Supple, no lymphadenopathy, no thyromegaly, no bruits, no jugular venous distention  Cardiovascular :?????? Regular rhythm and rate, no murmurs, radial and dorsal pedal pulses 2+ bilaterally  Respiratory :??????Lungs clear to auscultation bilaterally, no wheezes, no crackles, no rhonchi.? Good air movement  Abdomen :?????? ?NABS, soft, nontender, no hepatosplenomegaly, no masses, no guarding or rebound??????????????????????????  MS :??????? No muscle tenderness, joints WNL, FROM, negative SLR, no?CCE  Neuro :? ?????? CN II-XII intact, reflexes 2+ throughout, no motor sensory deficits, negative?cerebellar? tests  Psych :??????Normal affect, patient calm, good historian, patient answers questions??? appropriately.????Good hygiene? ??  Heme :?????? No bruising or petechiae?  Assessment/Plan  1.?Wellness examination?Z00.00  ?Age-appropriate wellness topics discussed.? Encouraged to lose weight and increase exercise.  2.?Hypertension?I10  ?Not controlled. ?Will increase valsartan to 160 mg.??Continue amlodipine 10 mg.? He is advised to call if he has severe headaches, chest pain, or dizzy spells  3.?Hypercholesteremia?E78.00  ?We will check CMP and lipids.  4.?Prediabetes?R73.03  Diet discussed in detail  Referrals  Clinic Follow up, *Est. 04/21/20 9:30:00 CDT, Order for future visit, Hypercholesteremia, [facility]   Problem List/Past Medical History  Ongoing  Allergic  rhinitis  Hypercholesteremia  Hypertension  Obesity  Prediabetes  Vertigo  Wellness examination  Historical  HTN (hypertension)  PAD - Peripheral arterial disease  Procedure/Surgical History  Catheter placement in coronary artery(s) for coronary angiography, including intraprocedural injection(s) for coronary angiography, imaging supervision and interpretation; with left heart catheterization including intraprocedural injection(s) for left cass (10/20/2017)  Fluoroscopy of Multiple Coronary Arteries using Low Osmolar Contrast (10/20/2017)  Measurement of Cardiac Sampling and Pressure, Left Heart, Percutaneous Approach (10/20/2017)  Drainage of scrotal wall abscess (03/27/2015)  Incision and drainage of scrotum and tunica vaginalis (03/27/2015)  CLOSURE OF SKIN AND SUBCUTANEOUS TISSUE OTHER SITES (02/24/2013)  Simple repair of superficial wounds of scalp, neck, axillae, external genitalia, trunk and/or extremities (including hands and feet); 2.5 cm or less. (02/24/2013)  hand surgery   Medications  amlodipine 10 mg oral tablet, See Instructions, 1 refills  aspirin 81 mg oral tablet, CHEWABLE, 81 mg= 1 tab(s), Oral, Daily  meclizine 25 mg oral tablet, 25 mg= 1 tab(s), Oral, QID, 1 refills  rosuvastatin 5 mg oral tablet, 5 mg= 1 tab(s), Oral, Daily, 1 refills  Toradol, 60 mg= 2 mL, IM, Once  valsartan 160 mg oral tablet, 160 mg= 1 tab(s), Oral, Daily, 1 refills  Allergies  No Known Allergies  Social History  Abuse/Neglect  No, 10/21/2019  Alcohol - Denies Alcohol Use, 11/19/2012  Current, 1-2 times per year, 03/29/2018  Current, 1-2 times per year, 08/29/2016  Current, Liquor, 1-2 times per month, 2 drinks/episode average., 03/27/2015  Employment/School  Employed, Work/School description: ., 10/21/2019  Substance Use - Denies Substance Abuse, 11/22/2012  Past, 10/20/2017  Tobacco - Denies Tobacco Use, 07/30/2012  Never (less than 100 in lifetime), N/A, 10/21/2019  Never (less than 100 in lifetime), N/A,  01/16/2019  Never smoker, 08/29/2016  Immunizations  Vaccine Date Status   diphtheria-tetanus toxoids 03/01/2013 Recorded   Health Maintenance  Health Maintenance  ???Pending?(in the next year)  ??? ??OverDue  ??? ? ? ?Coronary Artery Disease Maintenance-Antiplatelet Agent Prescribed due??and every?  ??? ? ? ?Coronary Artery Disease Maintenance-Lipid Lowering Therapy due??and every?  ??? ? ? ?Diabetes Screening due??and every?  ??? ? ? ?Aspirin Therapy for CVD Prevention due??02/20/15??and every 1??year(s)  ??? ? ? ?Alcohol Misuse Screening due??01/01/19??and every 1??year(s)  ??? ??Due?  ??? ? ? ?ADL Screening due??10/24/19??and every 1??year(s)  ??? ? ? ?Depression Screening due??10/24/19??and every?  ??? ? ? ?Hypertension Management-Education due??10/24/19??and every 1??year(s)  ??? ? ? ?Influenza Vaccine due??10/24/19??and every?  ??? ??Due In Future?  ??? ? ? ?Obesity Screening not due until??01/01/20??and every 1??year(s)  ??? ? ? ?Smoking Cessation (Coronary Artery Disease) not due until??03/28/20??and every 2??year(s)  ??? ? ? ?Blood Pressure Screening not due until??10/20/20??and every 1??year(s)  ??? ? ? ?Body Mass Index Check not due until??10/20/20??and every 1??year(s)  ??? ? ? ?Hypertension Management-Blood Pressure not due until??10/20/20??and every 1??year(s)  ??? ? ? ?Hypertension Management-BMP not due until??10/20/20??and every 1??year(s)  ???Satisfied?(in the past 1 year)  ??? ??Satisfied?  ??? ? ? ?Blood Pressure Screening on??10/21/19.??Satisfied by Loulou Falcon LPN  ??? ? ? ?Body Mass Index Check on??10/21/19.??Satisfied by Loulou Falcon LPN  ??? ? ? ?Coronary Artery Disease Maintenance-Lipid Lowering Therapy on??10/21/19.??Satisfied by Ayla Conklin ?PADMINI MARS  ??? ? ? ?Diabetes Screening on??10/21/19.??Satisfied by Mckayla Barnes  ??? ? ? ?Hypertension Management-BMP on??10/21/19.??Satisfied by Mckayla Barnes  ??? ? ? ?Influenza Vaccine on??10/21/19.??Satisfied by Ezekiel SHIPLEY,  Loulou  ??? ? ? ?Lipid Screening on??10/21/19.??Satisfied by Mckayla Barnes  ??? ? ? ?Obesity Screening on??10/21/19.??Satisfied by Loulou Falcno LPN  ?

## 2022-05-02 NOTE — HISTORICAL [facility]
This is a historical note converted from Georgie. Formatting and pictures may have been removed.  Please reference Georgie for original formatting and attached multimedia. Chief Complaint  Went to ER at [facility] 1/4/18, had abcess drained  History of Present Illness  Patient with abscess of left calf for 2 weeks. Went to [facility] on 1-4-19, they I&D and packed it. Finished 10 days of anibiotic, likely Clinda because was QID. No fever.  Review of Systems  See HPI. ?Denies?polyuria, polydipsia, or?polyphagia  Physical Exam  Vitals & Measurements  HR:?84(Peripheral)? BP:?120/88?  HT:?185?cm? HT:?185?cm? WT:?124?kg? WT:?124?kg? BMI:?36.23?  General:?Patient alert and oriented, NAD on room air  EXT:?Tender area with mild induration left calf with?central area of incision.? Small amount of?beau exudate?at incision site.? Erythema?2 cm?in diameter from?incision site. ?No LAD  CV RRR, no murmur  Lungs: CTA B  ABD: Benign  A1c: 5.7  Assessment/Plan  1.?Abscess?L02.91  Prescribe Bactrim DS twice daily for 10 days. ?Patient to call us if?symptoms worsen or if they fail to resolve.  2.?Prediabetes?R73.03  ?Somewhat improved.? Encouraged and?instructed on diet.   Problem List/Past Medical History  Ongoing  Allergic rhinitis  Hypercholesteremia  Hypertension  Obesity  Prediabetes  Vertigo  Wellness examination  Historical  HTN (hypertension)  PAD - Peripheral arterial disease  Procedure/Surgical History  hand surgery   Medications  amlodipine 10 mg oral tablet, See Instructions  aspirin 81 mg oral tablet, CHEWABLE, 81 mg= 1 tab(s), Oral, Daily  Bactrim  mg-160 mg oral tablet, 1 tab(s), Oral, BID  IRBESARTAN 75 MG TABLET, 75 mg= 1 tab(s), Oral, Daily  meclizine 25 mg oral tablet, 25 mg= 1 tab(s), Oral, QID, 1 refills  MUPIROCIN 2% OINTMENT, TOP, TID,? ?Not taking  Nasonex 50 mcg/inh nasal spray, 2 spray(s), Nasal, Daily, 1 refills,? ?Not taking  rosuvastatin 5 mg oral tablet, 5 mg= 1 tab(s), Oral, Daily  SULFAMETHOXAZOLE-TMP DS  TABLET, 1 tab(s), Oral, BID,? ?Not taking  Toradol, 60 mg= 2 mL, IM, Once  VALSARTAN 80 MG TABLET, 80 mg= 1 tab(s), Oral, Daily  Allergies  No Known Allergies  Social History  Alcohol - Denies Alcohol Use, 11/19/2012  Current, 1-2 times per year, 03/29/2018  Current, 1-2 times per year, 08/29/2016  Current, Liquor, 1-2 times per month, 2 drinks/episode average., 03/27/2015  Substance Abuse - Denies Substance Abuse, 11/22/2012  Past, 10/20/2017  Tobacco - Denies Tobacco Use, 07/30/2012  Never (less than 100 in lifetime), N/A, 01/16/2019  Never smoker, 08/29/2016  Immunizations  Vaccine Date Status   diphtheria-tetanus toxoids 03/01/2013 Recorded   Health Maintenance  Health Maintenance  ???Pending?(in the next year)  ??? ??OverDue  ??? ? ? ?Coronary Artery Disease Maintenance-Antiplatelet Agent Prescribed due??and every?  ??? ? ? ?Coronary Artery Disease Maintenance-Lipid Lowering Therapy due??and every?  ??? ? ? ?Diabetes Screening due??and every?  ??? ? ? ?Aspirin Therapy for CVD Prevention due??02/20/15??and every 1??year(s)  ??? ? ? ?Smoking Cessation due??07/04/15??and every 1??year(s)  ??? ??Due?  ??? ? ? ?ADL Screening due??01/21/19??and every 1??year(s)  ??? ? ? ?Alcohol Misuse Screening due??01/21/19??and every 1??year(s)  ??? ? ? ?Depression Screening due??01/21/19??and every?  ??? ? ? ?Hypertension Management-Education due??01/21/19??and every 1??year(s)  ??? ??Due In Future?  ??? ? ? ?Hypertension Management-BMP not due until??03/29/19??and every 1??year(s)  ??? ? ? ?Blood Pressure Screening not due until??01/16/20??and every 1??year(s)  ??? ? ? ?Body Mass Index Check not due until??01/16/20??and every 1??year(s)  ??? ? ? ?Hypertension Management-Blood Pressure not due until??01/16/20??and every 1??year(s)  ??? ? ? ?Obesity Screening not due until??01/16/20??and every 1??year(s)  ???Satisfied?(in the past 1 year)  ??? ??Satisfied?  ??? ? ? ?Blood Pressure Screening on??01/16/19.??Satisfied by Bandar LEIVA,  Yanelis AMBRIZ  ??? ? ? ?Body Mass Index Check on??01/16/19.??Satisfied by Yanelis Portillo CMA  ??? ? ? ?Diabetes Screening on??01/16/19.??Satisfied by Casandra Retana  ??? ? ? ?Hypertension Management-Blood Pressure on??01/16/19.??Satisfied by Yanelis Portillo CMA  ??? ? ? ?Influenza Vaccine on??01/16/19.??Satisfied by Yanelis Portillo CMA  ??? ? ? ?Lipid Screening on??03/29/18.??Satisfied by Oh Lang  ??? ? ? ?Obesity Screening on??01/16/19.??Satisfied by Yanelis Portillo CMA  ?  ?  Lab Results  Test Name Test Result Date/Time   Hgb A1c 5.7 % 01/16/2019 15:43 CST   Hgb A1c 5.8 % 03/29/2018 08:22 CDT

## 2022-05-02 NOTE — HISTORICAL [facility]
This is a historical note converted from Georgie. Formatting and pictures may have been removed.  Please reference Georgie for original formatting and attached multimedia. Chief Complaint  6 MTH RECHECK  History of Present Illness  Patient presents for 6-month follow-up visit.  Took BP medication last night. BP usually normal at home.  No nicotine or etoh.? Working in [place], . Physically demanding at times.  Declines Flu vaccine. Not planning on getting COVID vaccine.  Review of Systems  General:??????????????? Patient reports energy level is good.??Denies fever,chills, night sweats, fatigue or weakness.  HEENT:?????????????????? Denies vision changes or eye pain.? No sore throat, ear pain, sinus pressure or discharge.  Cardiovascular:??? Denies chest pain, palpitations, dyspnea on exertion, orthopnea.  Respiratory:???????? No cough, wheezing, shortness of breath, or sputum.  GI:????????????????????????? Denies nausea, emesis, constipation, diarrhea, melena, hematochezia or abdominal pain  :??????????????????????? No frequency, urgency, hematuria, discharge, or incontinence  MS:?????????????????????? Denies myalgias, arthralgias, joint effusion, edema, or weakness  Neuro:????????????????? No headaches, numbness in extremities, tingling, dizziness, or weakness  Psych:?????????????????? Denies anxiety, depression, suicidal or homicidal ideations, or irritability  Endo:??????????????????? Denies polyuria, polydipsia, polyphagia  Heme:????????????????? No abnormal bleeding or bruising. No lymph node enlargement or pain.  Physical Exam  Vitals & Measurements  HR:?64(Peripheral)? BP:?120/88?  HT:?185.00?cm? HT:?185?cm? WT:?125.100?kg? WT:?125.1?kg? BMI:?36.55?  General :?????Well-developed and? nourished, no apparent distress, alert and oriented ?4  HEENT:????? TMs and EACs within normal limits,?nasal mucosa within normal limits?with no discharge,?oropharynx clear  Integument :????? Skin is intact with no  erythema.? No pustules or vesicles.? No rash or scale. No Lymphadenopathy.? No urticaria.? No abnormal nevi  Neck :?????Supple, no lymphadenopathy, no thyromegaly, no bruits, no jugular venous distention  Cardiovascular :?????? Regular rhythm and rate, no murmurs, radial and dorsal pedal pulses 2+ bilaterally  Respiratory :??????Lungs clear to auscultation bilaterally, no wheezes, no crackles, no rhonchi.? Good air movement  Abdomen :?????? ?NABS, soft, nontender, no hepatosplenomegaly, no masses, no guarding or rebound????????????????????????  Ext:??????? No muscle tenderness, joints WNL, FROM, negative SLR, no?CCE  Neuro :? ?????? CN II-XII intact, reflexes 2+ throughout, no motor sensory deficits, negative?cerebellar? tests  Heme :?????? No bruising or petechiae?  Back:??????? no CVAT  Assessment/Plan  1.?Hypertension?I10  ?Controlled.? Amlodipine 10 mg and valsartan 160 mg refilled for 6 months.  2.?Hypercholesteremia?E78.00  ?We will check a CMP and lipids. ?Refilled rosuvastatin 5 mg for 6 months.  3.?Prediabetes?R73.03  ?We will check an A1c.  4.?Vitamin D deficiency?E55.9  ?Check a vitamin D level.  Referrals  Clinic Follow up, *Est. 04/19/22 8:15:00 CDT, CPX in 6 months, Order for future visit, Vitamin D deficiency, [facility]   Problem List/Past Medical History  Ongoing  Allergic rhinitis  Hypercholesteremia  Hypertension  No-show for appointment  Obesity  Prediabetes  Prostate cancer screening  Vertigo  Wellness examination  Historical  HTN (hypertension)  PAD - Peripheral arterial disease  Procedure/Surgical History  Catheter placement in coronary artery(s) for coronary angiography, including intraprocedural injection(s) for coronary angiography, imaging supervision and interpretation; with left heart catheterization including intraprocedural injection(s) for left cass (10/20/2017)  Fluoroscopy of Multiple Coronary Arteries using Low Osmolar Contrast (10/20/2017)  Measurement of Cardiac Sampling and  Pressure, Left Heart, Percutaneous Approach (10/20/2017)  Drainage of scrotal wall abscess (03/27/2015)  Incision and drainage of scrotum and tunica vaginalis (03/27/2015)  CLOSURE OF SKIN AND SUBCUTANEOUS TISSUE OTHER SITES (02/24/2013)  Simple repair of superficial wounds of scalp, neck, axillae, external genitalia, trunk and/or extremities (including hands and feet); 2.5 cm or less. (02/24/2013)  hand surgery   Medications  amlodipine 10 mg oral tablet, 10 mg= 1 tab(s), Oral, Daily, 1 refills  aspirin 81 mg oral tablet, CHEWABLE, 81 mg= 1 tab(s), Oral, Daily  rosuvastatin 5 mg oral tablet, See Instructions, 1 refills  Toradol, 60 mg= 2 mL, IM, Once  valsartan 160 mg oral tablet, See Instructions, 1 refills  Allergies  No Known Allergies  Social History  Abuse/Neglect  No, 10/18/2021  No, 02/01/2021  No, 06/03/2020  No, 01/04/2020  Alcohol - Denies Alcohol Use, 11/19/2012  Past, 01/04/2020  Employment/School  Employed, Work/School description: ., 10/21/2019  Substance Use - Denies Substance Abuse, 11/22/2012  Never, 01/04/2020  Tobacco - Denies Tobacco Use, 07/30/2012  Never (less than 100 in lifetime), No, 10/18/2021  Never (less than 100 in lifetime), No, 02/01/2021  Never (less than 100 in lifetime), No, 06/03/2020  Never (less than 100 in lifetime), No, 01/04/2020  Immunizations  Vaccine Date Status   diphtheria-tetanus toxoids 03/01/2013 Recorded   Health Maintenance  Health Maintenance  ???Pending?(in the next year)  ??? ??OverDue  ??? ? ? ?Aspirin Therapy for CVD Prevention due??02/20/15??and every 1??year(s)  ??? ? ? ?Alcohol Misuse Screening due??01/02/21??and every 1??year(s)  ??? ??Due?  ??? ? ? ?ADL Screening due??10/31/21??and every 1??year(s)  ??? ? ? ?Depression Screening due??10/31/21??Unknown Frequency  ??? ? ? ?Hypertension Management-Education due??10/31/21??and every 1??year(s)  ??? ? ? ?Zoster Vaccine due??10/31/21??Unknown Frequency  ??? ??Due In Future?  ??? ? ? ?Obesity  Screening not due until??01/01/22??and every 1??year(s)  ??? ? ? ?Diabetes Screening not due until??04/16/22??and every 1??year(s)  ??? ? ? ?Hypertension Management-BMP not due until??04/16/22??and every 1??year(s)  ??? ? ? ?Blood Pressure Screening not due until??10/18/22??and every 1??year(s)  ??? ? ? ?Body Mass Index Check not due until??10/18/22??and every 1??year(s)  ??? ? ? ?Hypertension Management-Blood Pressure not due until??10/18/22??and every 1??year(s)  ???Satisfied?(in the past 1 year)  ??? ??Satisfied?  ??? ? ? ?Blood Pressure Screening on??10/18/21.??Satisfied by Ayla Conklin ?PADMINI MARS  ??? ? ? ?Body Mass Index Check on??10/18/21.??Satisfied by Ana Luisa Vickers CMA S.  ??? ? ? ?Coronary Artery Disease Maintenance-Lipid Lowering Therapy on??10/18/21.??Satisfied by Ayla Conklin ?PADMINI MARS  ??? ? ? ?Diabetes Screening on??10/18/21.??Satisfied by Oh Lang  ??? ? ? ?Hypertension Management-BMP on??10/18/21.??Satisfied by Oh Lang  ??? ? ? ?Influenza Vaccine on??10/18/21.??Satisfied by Ana Luisa Vickers CMA S.  ??? ? ? ?Lipid Screening on??10/18/21.??Satisfied by Oh Lang  ??? ? ? ?Obesity Screening on??10/18/21.??Satisfied by Ana Luisa Vickers CMA S.  ?

## 2023-03-07 ENCOUNTER — HOSPITAL ENCOUNTER (EMERGENCY)
Facility: HOSPITAL | Age: 53
Discharge: HOME | End: 2023-03-07
Attending: STUDENT IN AN ORGANIZED HEALTH CARE EDUCATION/TRAINING PROGRAM
Payer: COMMERCIAL

## 2023-03-07 VITALS
DIASTOLIC BLOOD PRESSURE: 88 MMHG | BODY MASS INDEX: 37.11 KG/M2 | HEART RATE: 74 BPM | WEIGHT: 280 LBS | TEMPERATURE: 98 F | OXYGEN SATURATION: 96 % | HEIGHT: 73 IN | SYSTOLIC BLOOD PRESSURE: 135 MMHG | RESPIRATION RATE: 18 BRPM

## 2023-03-07 DIAGNOSIS — R07.9 CHEST PAIN: ICD-10-CM

## 2023-03-07 DIAGNOSIS — I10 HYPERTENSION, UNSPECIFIED TYPE: ICD-10-CM

## 2023-03-07 DIAGNOSIS — R07.89 ATYPICAL CHEST PAIN: Primary | ICD-10-CM

## 2023-03-07 LAB
ALBUMIN SERPL-MCNC: 4.2 G/DL (ref 3.5–5)
ALBUMIN/GLOB SERPL: 1.1 RATIO (ref 1.1–2)
ALP SERPL-CCNC: 44 UNIT/L (ref 40–150)
ALT SERPL-CCNC: 17 UNIT/L (ref 0–55)
AST SERPL-CCNC: 23 UNIT/L (ref 5–34)
BASOPHILS # BLD AUTO: 0.03 X10(3)/MCL (ref 0–0.2)
BASOPHILS NFR BLD AUTO: 0.7 %
BILIRUBIN DIRECT+TOT PNL SERPL-MCNC: 0.9 MG/DL
BNP BLD-MCNC: <10 PG/ML
BUN SERPL-MCNC: 17.6 MG/DL (ref 8.4–25.7)
CALCIUM SERPL-MCNC: 9.3 MG/DL (ref 8.4–10.2)
CHLORIDE SERPL-SCNC: 110 MMOL/L (ref 98–107)
CK SERPL-CCNC: 514 U/L (ref 30–200)
CO2 SERPL-SCNC: 21 MMOL/L (ref 22–29)
CREAT SERPL-MCNC: 1.06 MG/DL (ref 0.73–1.18)
EOSINOPHIL # BLD AUTO: 0.31 X10(3)/MCL (ref 0–0.9)
EOSINOPHIL NFR BLD AUTO: 7.4 %
ERYTHROCYTE [DISTWIDTH] IN BLOOD BY AUTOMATED COUNT: 14.4 % (ref 11.5–17)
GFR SERPLBLD CREATININE-BSD FMLA CKD-EPI: >60 MLS/MIN/1.73/M2
GLOBULIN SER-MCNC: 4 GM/DL (ref 2.4–3.5)
GLUCOSE SERPL-MCNC: 103 MG/DL (ref 74–100)
HCT VFR BLD AUTO: 45.4 % (ref 42–52)
HGB BLD-MCNC: 15 G/DL (ref 14–18)
IMM GRANULOCYTES # BLD AUTO: 0 X10(3)/MCL (ref 0–0.04)
IMM GRANULOCYTES NFR BLD AUTO: 0 %
LYMPHOCYTES # BLD AUTO: 1.64 X10(3)/MCL (ref 0.6–4.6)
LYMPHOCYTES NFR BLD AUTO: 39.2 %
MCH RBC QN AUTO: 28.7 PG
MCHC RBC AUTO-ENTMCNC: 33 G/DL (ref 33–36)
MCV RBC AUTO: 87 FL (ref 80–94)
MONOCYTES # BLD AUTO: 0.37 X10(3)/MCL (ref 0.1–1.3)
MONOCYTES NFR BLD AUTO: 8.9 %
NEUTROPHILS # BLD AUTO: 1.83 X10(3)/MCL (ref 2.1–9.2)
NEUTROPHILS NFR BLD AUTO: 43.8 %
NRBC BLD AUTO-RTO: 0 %
PLATELET # BLD AUTO: 241 X10(3)/MCL (ref 130–400)
PMV BLD AUTO: 8.7 FL (ref 7.4–10.4)
POTASSIUM SERPL-SCNC: 3.8 MMOL/L (ref 3.5–5.1)
PROT SERPL-MCNC: 8.2 GM/DL (ref 6.4–8.3)
RBC # BLD AUTO: 5.22 X10(6)/MCL (ref 4.7–6.1)
SODIUM SERPL-SCNC: 139 MMOL/L (ref 136–145)
TROPONIN I SERPL-MCNC: <0.01 NG/ML (ref 0–0.04)
TROPONIN I SERPL-MCNC: <0.01 NG/ML (ref 0–0.04)
WBC # SPEC AUTO: 4.2 X10(3)/MCL (ref 4.5–11.5)

## 2023-03-07 PROCEDURE — 96374 THER/PROPH/DIAG INJ IV PUSH: CPT

## 2023-03-07 PROCEDURE — 83880 ASSAY OF NATRIURETIC PEPTIDE: CPT | Performed by: PHYSICIAN ASSISTANT

## 2023-03-07 PROCEDURE — 25000003 PHARM REV CODE 250: Performed by: PHYSICIAN ASSISTANT

## 2023-03-07 PROCEDURE — 99285 EMERGENCY DEPT VISIT HI MDM: CPT | Mod: 25

## 2023-03-07 PROCEDURE — 96361 HYDRATE IV INFUSION ADD-ON: CPT

## 2023-03-07 PROCEDURE — 80053 COMPREHEN METABOLIC PANEL: CPT | Performed by: PHYSICIAN ASSISTANT

## 2023-03-07 PROCEDURE — 84484 ASSAY OF TROPONIN QUANT: CPT | Performed by: STUDENT IN AN ORGANIZED HEALTH CARE EDUCATION/TRAINING PROGRAM

## 2023-03-07 PROCEDURE — 63600175 PHARM REV CODE 636 W HCPCS: Performed by: STUDENT IN AN ORGANIZED HEALTH CARE EDUCATION/TRAINING PROGRAM

## 2023-03-07 PROCEDURE — 93010 EKG 12-LEAD: ICD-10-PCS | Mod: ,,, | Performed by: INTERNAL MEDICINE

## 2023-03-07 PROCEDURE — 93010 ELECTROCARDIOGRAM REPORT: CPT | Mod: ,,, | Performed by: INTERNAL MEDICINE

## 2023-03-07 PROCEDURE — 93005 ELECTROCARDIOGRAM TRACING: CPT

## 2023-03-07 PROCEDURE — 82550 ASSAY OF CK (CPK): CPT | Performed by: PHYSICIAN ASSISTANT

## 2023-03-07 PROCEDURE — 84484 ASSAY OF TROPONIN QUANT: CPT | Performed by: PHYSICIAN ASSISTANT

## 2023-03-07 PROCEDURE — 85025 COMPLETE CBC W/AUTO DIFF WBC: CPT | Performed by: PHYSICIAN ASSISTANT

## 2023-03-07 RX ORDER — KETOROLAC TROMETHAMINE 30 MG/ML
15 INJECTION, SOLUTION INTRAMUSCULAR; INTRAVENOUS
Status: COMPLETED | OUTPATIENT
Start: 2023-03-07 | End: 2023-03-07

## 2023-03-07 RX ORDER — ASPIRIN 325 MG
325 TABLET ORAL
Status: COMPLETED | OUTPATIENT
Start: 2023-03-07 | End: 2023-03-07

## 2023-03-07 RX ORDER — NITROGLYCERIN 0.4 MG/1
0.4 TABLET SUBLINGUAL EVERY 5 MIN PRN
Status: DISCONTINUED | OUTPATIENT
Start: 2023-03-07 | End: 2023-03-07 | Stop reason: HOSPADM

## 2023-03-07 RX ADMIN — ASPIRIN 325 MG ORAL TABLET 325 MG: 325 PILL ORAL at 11:03

## 2023-03-07 RX ADMIN — KETOROLAC TROMETHAMINE 15 MG: 30 INJECTION, SOLUTION INTRAMUSCULAR; INTRAVENOUS at 04:03

## 2023-03-07 RX ADMIN — SODIUM CHLORIDE 1000 ML: 9 INJECTION, SOLUTION INTRAVENOUS at 01:03

## 2023-03-07 RX ADMIN — SODIUM CHLORIDE 500 ML: 9 INJECTION, SOLUTION INTRAVENOUS at 11:03

## 2023-03-07 NOTE — ED PROVIDER NOTES
Encounter Date: 3/7/2023    SCRIBE #1 NOTE: I, Beatriz Hernandez, am scribing for, and in the presence of,  Kenneth Yancey MD. I have scribed the following portions of the note - Other sections scribed: HPI, ROS, PE.     History     Chief Complaint   Patient presents with    Chest Pain     Pt c/o L side CP and numb finger tips on L side since Sunday morning. Denies SOB. PMH of HTN     52 year old male presents to ED complaining of chest pain.  Pt reports intermittent stabbing chest pain that began on Sunday.  Pt says he also feels jittery.  He has seen Dr. Shaw for cardiology check ups in the past.  Pt denies any N/V or shortness of breath. Pt denies ETOH use or smoking.    The history is provided by the patient. No  was used.   Chest Pain  The current episode started two days ago. Chest pain occurs intermittently. The chest pain is unchanged. The quality of the pain is described as sharp. Pertinent negatives for primary symptoms include no fever, no shortness of breath, no wheezing, no palpitations, no abdominal pain, no nausea and no vomiting.   Pertinent negatives for associated symptoms include no numbness and no weakness.   Review of patient's allergies indicates:  No Known Allergies  No past medical history on file.  No past surgical history on file.  No family history on file.  Social History     Tobacco Use    Smoking status: Never    Smokeless tobacco: Never     Review of Systems   Constitutional:  Negative for chills and fever.   HENT:  Negative for drooling and sore throat.    Eyes:  Negative for pain and redness.   Respiratory:  Negative for shortness of breath, wheezing and stridor.    Cardiovascular:  Positive for chest pain. Negative for palpitations and leg swelling.   Gastrointestinal:  Negative for abdominal pain, nausea and vomiting.   Genitourinary:  Negative for dysuria and hematuria.   Musculoskeletal:  Negative for back pain, neck pain and neck stiffness.   Skin:   Negative for rash and wound.   Neurological:  Negative for weakness and numbness.   Hematological:  Does not bruise/bleed easily.     Physical Exam     Initial Vitals [03/07/23 1116]   BP Pulse Resp Temp SpO2   (!) 141/98 93 18 98.3 °F (36.8 °C) 96 %      MAP       --         Physical Exam    Nursing note and vitals reviewed.  Constitutional: He appears well-developed. He is not diaphoretic. No distress.   HENT:   Head: Normocephalic and atraumatic.   Nose: Nose normal.   Mouth/Throat: Oropharynx is clear and moist.   Eyes: Conjunctivae and EOM are normal. Pupils are equal, round, and reactive to light.   Cardiovascular:  Normal rate and regular rhythm.           No murmur heard.  Pulmonary/Chest: Breath sounds normal. No respiratory distress. He has no wheezes. He has no rales.   Abdominal: Abdomen is soft. He exhibits no distension. There is no abdominal tenderness.     Neurological: He is alert and oriented to person, place, and time. He has normal strength. No cranial nerve deficit.   Skin: Skin is warm. Capillary refill takes less than 2 seconds. No rash noted.       ED Course   Procedures  Labs Reviewed   COMPREHENSIVE METABOLIC PANEL - Abnormal; Notable for the following components:       Result Value    Chloride 110 (*)     Carbon Dioxide 21 (*)     Glucose Level 103 (*)     Globulin 4.0 (*)     All other components within normal limits   CBC WITH DIFFERENTIAL - Abnormal; Notable for the following components:    WBC 4.2 (*)     Neut # 1.83 (*)     All other components within normal limits   CK - Abnormal; Notable for the following components:    Creatine Kinase 514 (*)     All other components within normal limits   TROPONIN I - Normal   B-TYPE NATRIURETIC PEPTIDE - Normal   TROPONIN I - Normal   CBC W/ AUTO DIFFERENTIAL    Narrative:     The following orders were created for panel order CBC auto differential.  Procedure                               Abnormality         Status                     ---------                                -----------         ------                     CBC with Differential[[id number]]        Abnormal            Final result                 Please view results for these tests on the individual orders.        ECG Results              EKG 12-lead (Final result)  Result time 03/07/23 13:12:04      Final result by Interface, Lab In [facility] (03/07/23 13:12:04)                   Narrative:    Test Reason : R07.9,    Vent. Rate : 082 BPM     Atrial Rate : 082 BPM     P-R Int : 172 ms          QRS Dur : 092 ms      QT Int : 366 ms       P-R-T Axes : 045 -04 030 degrees     QTc Int : 427 ms    Normal sinus rhythm  Normal ECG  No previous ECGs available  Confirmed by Daniel Howard MD ([id number]) on 3/7/2023 1:11:57 PM    Referred By:             Confirmed By:Daniel Howard MD                                  Imaging Results              X-Ray Chest AP Portable (Final result)  Result time 03/07/23 12:53:36      Final result by Judd Gutierrez MD (03/07/23 12:53:36)                   Impression:      No acute findings.      Electronically signed by: Judd Gutierrez  Date:    03/07/2023  Time:    12:53               Narrative:    EXAMINATION:  XR CHEST AP PORTABLE    CLINICAL HISTORY:  Chest Pain;    COMPARISON:  4 January 2020    FINDINGS:  Portable frontal view of the chest was obtained. Heart and mediastinum unchanged.  Lungs are grossly clear.  There is no pneumothorax or significant pleural effusion.                                       Medications   aspirin tablet 325 mg (325 mg Oral Given 3/7/23 1130)   sodium chloride 0.9% bolus 500 mL 500 mL (0 mLs Intravenous Stopped 3/7/23 1230)   sodium chloride 0.9% bolus 1,000 mL 1,000 mL (1,000 mLs Intravenous New Bag 3/7/23 1315)   ketorolac injection 15 mg (15 mg Intravenous Given 3/7/23 1600)     Medical Decision Making  Problems Addressed:  Atypical chest pain: acute illness or injury  Chest pain: acute illness or injury with systemic symptoms that poses a  threat to life or bodily functions  Hypertension, unspecified type: chronic illness or injury        Differential diagnosis (includes but is not limited to):   ACS, arrhythmia, costochondritis, pleurisy, pericarditis, musculoskeletal chest wall pain, pneumothorax    MDM Narrative  52-year-old male presents for evaluation of chest pain that has been ongoing for years but apparently worsened Sunday morning.  He denies any pain currently.  EKG reviewed.  Chest x-ray is pending.  Labs including troponin pending.  Aspirin has been ordered.  Continue pulse oximetry and telemetry monitoring.  Telemetry monitoring independently reviewed and shows a sinus rhythm with heart rate in the 70s.  Pain and nausea control as needed.  IV fluid rehydration ordered.    Update:  Labs have been reviewed, CK mildly elevated with no evidence of renal dysfunction, I expect this to improve with IV fluids.  Otherwise unremarkable, troponin negative.  EKG unremarkable.  However, patient is well known to the cardiology service wife consulted them to discuss the case.  Cardiology feels comfortable with discharge home and follow-up in clinic with Dr. Gibson as an outpatient.  Patient agrees with plan for discharge and all questions answered at bedside.  Patient is agreeable to waiting for a 2nd troponin prior to discharge.    Uptake: 2nd troponin is negative, patient remains asymptomatic.  Patient is ambulatory at his baseline with a steady gait without pain or nausea.  He is tolerating oral intake well.  Patient states he is ready for discharge home.  I have stressed the importance of following up with his PCP and Cardiology as we have discussed and he states he understands.  Strict return precautions have been discussed and the patient has verbalized understanding.    Dispo:  Discharge    My independent radiology interpretation:  See above  Point of care US (independently performed and interpreted):   Decision rules/clinical scoring:     Amount  and/or Complexity of Data Reviewed  Independent historian: none   Summary of history:   External data reviewed: notes from previous admissions, notes from previous ED visits, notes from clinic visits, and prescription medications   Summary of data reviewed:   Risk and benefits of testing: discussed   Labs: ordered and reviewed  Radiology: ordered and independent interpretation performed (see above or ED course)  ECG/medicine tests: ordered and independent interpretation performed (see above or ED course)  Discussion of management or test interpretation with external provider(s): discussed with Cardiology consultant   Summary of discussion:  As above    Risk  OTC medications  Prescription drug management   Parenteral controlled substances   Drug therapy requiring intense monitoring for toxicity   Shared decision making     Critical Care  none    Data Reviewed/Counseling: I have personally reviewed the patient's vital signs, nursing notes, and other relevant tests, information, and imaging. I had a detailed discussion regarding the historical points, exam findings, and any diagnostic results supporting the discharge diagnosis. I personally performed the history, PE, MDM and procedures as documented above and agree with the scribe's documentation.    Portions of this note were dictated using voice recognition software. Although it was reviewed for accuracy, some inherent voice recognition errors may have occurred and may be present in this document.            Scribe Attestation:   Scribe #1: I performed the above scribed service and the documentation accurately describes the services I performed. I attest to the accuracy of the note.    Attending Attestation:           Physician Attestation for Scribe:  Physician Attestation Statement for Scribe #1: I, Kenneth Yancey MD, reviewed documentation, as scribed by Beatriz Hernandez in my presence, and it is both accurate and complete.           ED Course as of  03/10/23 1208   Tue Mar 07, 2023   1322 X-Ray Chest AP Portable  Independently visualized/reviewed by me during the ED visit.  - No consolidation, no PTX []   1322 EKG 12-lead  EKG independently interpreted by me.  EKG: NSR @ 82, no STEMI, QTc 427 []      ED Course User Index  [MC] Kenneth Yancey MD                 Clinical Impression:   Final diagnoses:  [R07.9] Chest pain  [R07.89] Atypical chest pain (Primary)  [I10] Hypertension, unspecified type        ED Disposition Condition    Discharge Stable          ED Prescriptions    None       Follow-up Information       Follow up With Specialties Details Why Contact Info    N. Aries Aguila Jr., MD Family Medicine In 2 days  [address] [facility] [address]  [phone]      Iglesia Shaw MD Cardiology  March 15 @ 4:20  [facility].  [facility] [address]  [phone]      [facility] - Emergency Dept Emergency Medicine  If symptoms worsen [address] [facility] [address]  [phone]             Kenneth Yancey MD  03/10/23 1200

## 2023-03-07 NOTE — FIRST PROVIDER EVALUATION
"Medical screening examination initiated.  I have conducted a focused provider triage encounter, findings are as follows:    Brief history of present illness:  Male with 3 days of worsening left sided "sharp/stinging" chest pain presents to ER for initial evaluation.    Vitals:    03/07/23 1116   BP: (!) 141/98   Pulse: 93   Resp: 18   Temp: 98.3 °F (36.8 °C)   TempSrc: Oral   SpO2: 96%   Weight: 127 kg (280 lb)   Height: 6' 1" (1.854 m)       Pertinent physical exam:  AAOx4 male ambulatory into triage    Brief workup plan:  labs, CXR, EKG, meds    Preliminary workup initiated; this workup will be continued and followed by the physician or advanced practice provider that is assigned to the patient when roomed.  "

## 2023-03-07 NOTE — DISCHARGE INSTRUCTIONS
Please follow up with your primary care physician in 2-3 days.      Your case was discussed with Cardiology.  Please immediately return for any worsening or return of your symptoms, including any chest pain or shortness of breath, feel like going to pass out, inability to drink, or any other concerns.    Your visit in the emergency department is NOT definitive care - please follow-up with your primary care doctor and/or specialist within 1-2 days.  Please return if you have any worsening in your condition or if you have any other concerns.    If you had radiology exams like an XRAY or CT in the emergency Department the interpretation on them may be preliminary - there may be less time sensitive findings on the reports. Please obtain these reports within 24 hours from the hospital or by using the mary kate for the portal on your mobile phone to access records.  Bring these to your primary care doctor and/or specialist for further review of incidental findings.    Please review any LAB WORK from your visit today with your primary care physician.    Please return to the emergency department if you develop any worsening symptoms, fever, chest pain, difficulty breathing, or any other new symptoms or concerns.      Thank you for allowing us to take care of you today.

## 2023-03-08 PROBLEM — E66.9 OBESITY: Status: ACTIVE | Noted: 2023-03-08

## 2023-03-08 PROBLEM — R73.03 PREDIABETES: Status: ACTIVE | Noted: 2023-03-08

## 2023-03-08 PROBLEM — E55.9 VITAMIN D DEFICIENCY: Status: ACTIVE | Noted: 2023-03-08

## 2023-03-08 PROBLEM — E78.00 HYPERCHOLESTEROLEMIA: Status: ACTIVE | Noted: 2023-03-08

## 2023-03-08 PROBLEM — I10 HYPERTENSION: Status: ACTIVE | Noted: 2023-03-08

## 2023-07-25 ENCOUNTER — LAB VISIT (OUTPATIENT)
Dept: LAB | Facility: HOSPITAL | Age: 53
End: 2023-07-25
Attending: INTERNAL MEDICINE
Payer: COMMERCIAL

## 2023-07-25 DIAGNOSIS — I10 HYPERTENSION, UNSPECIFIED TYPE: Primary | ICD-10-CM

## 2023-07-25 LAB
ALBUMIN SERPL-MCNC: 4.2 G/DL (ref 3.5–5)
ALBUMIN/GLOB SERPL: 1.3 RATIO (ref 1.1–2)
ALP SERPL-CCNC: 48 UNIT/L (ref 40–150)
ALT SERPL-CCNC: 16 UNIT/L (ref 0–55)
AST SERPL-CCNC: 27 UNIT/L (ref 5–34)
BILIRUBIN DIRECT+TOT PNL SERPL-MCNC: 0.7 MG/DL
BUN SERPL-MCNC: 14 MG/DL (ref 8.4–25.7)
CALCIUM SERPL-MCNC: 9 MG/DL (ref 8.4–10.2)
CHLORIDE SERPL-SCNC: 108 MMOL/L (ref 98–107)
CHOLEST SERPL-MCNC: 130 MG/DL
CHOLEST/HDLC SERPL: 3 {RATIO} (ref 0–5)
CO2 SERPL-SCNC: 25 MMOL/L (ref 22–29)
CREAT SERPL-MCNC: 1.02 MG/DL (ref 0.73–1.18)
ERYTHROCYTE [DISTWIDTH] IN BLOOD BY AUTOMATED COUNT: 13.9 % (ref 11.5–17)
EST. AVERAGE GLUCOSE BLD GHB EST-MCNC: 122.6 MG/DL
GFR SERPLBLD CREATININE-BSD FMLA CKD-EPI: >60 MLS/MIN/1.73/M2
GLOBULIN SER-MCNC: 3.2 GM/DL (ref 2.4–3.5)
GLUCOSE SERPL-MCNC: 103 MG/DL (ref 74–100)
HBA1C MFR BLD: 5.9 %
HCT VFR BLD AUTO: 43.6 % (ref 42–52)
HDLC SERPL-MCNC: 48 MG/DL (ref 35–60)
HGB BLD-MCNC: 14.3 G/DL (ref 14–18)
LDLC SERPL CALC-MCNC: 68 MG/DL (ref 50–140)
MCH RBC QN AUTO: 28.7 PG (ref 27–31)
MCHC RBC AUTO-ENTMCNC: 32.8 G/DL (ref 33–36)
MCV RBC AUTO: 87.6 FL (ref 80–94)
NRBC BLD AUTO-RTO: 0 %
PLATELET # BLD AUTO: 220 X10(3)/MCL (ref 130–400)
PMV BLD AUTO: 8.8 FL (ref 7.4–10.4)
POTASSIUM SERPL-SCNC: 4.1 MMOL/L (ref 3.5–5.1)
PROT SERPL-MCNC: 7.4 GM/DL (ref 6.4–8.3)
RBC # BLD AUTO: 4.98 X10(6)/MCL (ref 4.7–6.1)
SODIUM SERPL-SCNC: 139 MMOL/L (ref 136–145)
TRIGL SERPL-MCNC: 72 MG/DL (ref 34–140)
TSH SERPL-ACNC: 1.26 UIU/ML (ref 0.35–4.94)
VLDLC SERPL CALC-MCNC: 14 MG/DL
WBC # SPEC AUTO: 4.26 X10(3)/MCL (ref 4.5–11.5)

## 2023-07-25 PROCEDURE — 36415 COLL VENOUS BLD VENIPUNCTURE: CPT

## 2023-07-25 PROCEDURE — 80053 COMPREHEN METABOLIC PANEL: CPT

## 2023-07-25 PROCEDURE — 83036 HEMOGLOBIN GLYCOSYLATED A1C: CPT

## 2023-07-25 PROCEDURE — 80061 LIPID PANEL: CPT

## 2023-07-25 PROCEDURE — 85027 COMPLETE CBC AUTOMATED: CPT

## 2023-07-25 PROCEDURE — 84443 ASSAY THYROID STIM HORMONE: CPT

## 2024-01-18 ENCOUNTER — TELEPHONE (OUTPATIENT)
Dept: SURGERY | Facility: CLINIC | Age: 54
End: 2024-01-18
Payer: COMMERCIAL

## 2024-01-18 NOTE — TELEPHONE ENCOUNTER
----- Message from JENSEN Kumar sent at 1/18/2024 11:57 AM CST -----  Did pt have Abd US done? Please obtain report from [facility].   NCON next week for RI.

## 2024-01-18 NOTE — PROGRESS NOTES
[facility] - General Surgery Services  [address] [place] [id number]  Phone: [phone]  Fax: [phone]     HISTORY & PHYSICAL  General Surgery  Dr. Ankush Stephenson      Patient Name: Peter Hunt  YOB: 1970  Author: Seema    Date: 1/23/24    SUBJECTIVE:     Chief Complaint/Reason for Admission: No chief complaint on file.       History of Present Illness:  Mr. Peter Hunt is a 53 y.o. male who presents to clinic for surgical evaluation of right inguinal hernia.     He started noticing a bulge in his right groin.  It occasionally causes some pain and discomfort.   Denies any changes to bowel / bladder habits. He denies CP/SOB or fever/chills.          Positive symptoms:   Groin Pain right      Referring provider: LIONEL Aguila Jr.*   PCP: LIONEL Aguila Jr., MD     Review of Systems:  12 point ROS negative except as stated in HPI    PAST HISTORY:   No past medical history on file.  Past Surgical History:   Procedure Laterality Date    CLOSURE OF SKIN AND SUBCUTANEOUS TISSUE OTHER SITES (02/24/2013)      Drainage of scrotal wall abscess (03/27/2015)      Fluoroscopy of Multiple Coronary Arteries using Low Osmolar Contrast (10/20/2017)      HAND SURGERY      Incision and drainage of scrotum and tunica vaginalis (03/27/2015)      Measurement of Cardiac Sampling and Pressure, Left Heart, Percutaneous Approach (10/20/2017)      Simple repair of superficial wounds of scalp, neck, axillae, external genitalia, trunk and/or extremities (including hands and feet); 2.5 cm or less. (02/24/2013)       Family History   Problem Relation Age of Onset    Hypertension Mother     Coronary artery disease Father     Kidney disease Father     Hypertension Sister     Hypertension Brother     Hypertension Brother      Social History     Socioeconomic History    Marital status: Single    Number of children: 6   Occupational History    Occupation: Truck     Tobacco Use    Smoking status: Never    Smokeless tobacco: Never   Substance and Sexual Activity    Alcohol use: Not Currently    Drug use: Never       MEDICATIONS & ALLERGIES:     Current Outpatient Medications on File Prior to Visit   Medication Sig    amLODIPine (NORVASC) 10 MG tablet Take 1 tablet (10 mg total) by mouth once daily.    aspirin (ECOTRIN) 81 MG EC tablet Take 81 mg by mouth.    furosemide (LASIX) 20 MG tablet Take 20 mg by mouth.    rosuvastatin (CRESTOR) 5 MG tablet Take 1 tablet (5 mg total) by mouth once daily.    spironolactone (ALDACTONE) 25 MG tablet Take 25 mg by mouth.    valsartan (DIOVAN) 160 MG tablet Take 1 tablet (160 mg total) by mouth once daily.     No current facility-administered medications on file prior to visit.     Review of patient's allergies indicates:  No Known Allergies    OBJECTIVE:   There were no vitals filed for this visit.  There is no height or weight on file to calculate BMI.    Physical Exam:  General:  Well developed, well nourished, no acute distress  HEENT:  Normocephalic, atraumatic, PERRL, EOMI, clear sclera, ears normal, neck supple, throat clear without erythema or exudates  CVS:  RRR, S1 and S2 normal, no murmurs, rubs, gallops  Resp:  Lungs clear to auscultation, no wheezes, rales, rhonchi, cough  GI:  Abdomen soft, non-tender, non-distended, normoactive bowel sounds, no masses  :      R groin - Reducible Right Inguinal Hernia, nttp, testicle unremarkable  L groin -  no hernia appreciated, nttp, testicle unremarkable  MSK:  No muscle atrophy, cyanosis, peripheral edema, full range of motion  Skin:  No rashes, ulcers, erythema  Neuro:  CNII-XII grossly intact  Psych:  Alert and oriented to person, place, and time    Results:  I have independently reviewed all pertinent lab and radiologic studies relevant to general surgery.      VISIT DIAGNOSES:   No diagnosis found.    ASSESSMENT/PLAN:     Hernia     Plan:  Laparoscopic right inguinal hernia  repair   Dr. Stephenson examined patient, discussed recommendations, obtained informed consent, and answered all questions with patient/family.

## 2024-01-22 ENCOUNTER — TELEPHONE (OUTPATIENT)
Dept: SURGERY | Facility: CLINIC | Age: 54
End: 2024-01-22
Payer: COMMERCIAL

## 2024-01-23 ENCOUNTER — OFFICE VISIT (OUTPATIENT)
Dept: SURGERY | Facility: CLINIC | Age: 54
End: 2024-01-23
Payer: COMMERCIAL

## 2024-01-23 VITALS
HEIGHT: 73 IN | BODY MASS INDEX: 37 KG/M2 | WEIGHT: 279.19 LBS | SYSTOLIC BLOOD PRESSURE: 133 MMHG | DIASTOLIC BLOOD PRESSURE: 87 MMHG | HEART RATE: 76 BPM

## 2024-01-23 DIAGNOSIS — K40.90 UNILATERAL INGUINAL HERNIA WITHOUT OBSTRUCTION OR GANGRENE, RECURRENCE NOT SPECIFIED: ICD-10-CM

## 2024-01-23 DIAGNOSIS — K40.90 RIGHT INGUINAL HERNIA: Primary | ICD-10-CM

## 2024-01-23 DIAGNOSIS — Z01.818 PRE-OPERATIVE EXAMINATION: ICD-10-CM

## 2024-01-23 PROCEDURE — 3079F DIAST BP 80-89 MM HG: CPT | Mod: CPTII,,, | Performed by: SURGERY

## 2024-01-23 PROCEDURE — 99204 OFFICE O/P NEW MOD 45 MIN: CPT | Mod: ,,, | Performed by: SURGERY

## 2024-01-23 PROCEDURE — 4010F ACE/ARB THERAPY RXD/TAKEN: CPT | Mod: CPTII,,, | Performed by: SURGERY

## 2024-01-23 PROCEDURE — 3008F BODY MASS INDEX DOCD: CPT | Mod: CPTII,,, | Performed by: SURGERY

## 2024-01-23 PROCEDURE — 3044F HG A1C LEVEL LT 7.0%: CPT | Mod: CPTII,,, | Performed by: SURGERY

## 2024-01-23 PROCEDURE — 1159F MED LIST DOCD IN RCRD: CPT | Mod: CPTII,,, | Performed by: SURGERY

## 2024-01-23 PROCEDURE — 3075F SYST BP GE 130 - 139MM HG: CPT | Mod: CPTII,,, | Performed by: SURGERY

## 2024-01-24 DIAGNOSIS — K40.90 UNILATERAL INGUINAL HERNIA WITHOUT OBSTRUCTION OR GANGRENE, RECURRENCE NOT SPECIFIED: Primary | ICD-10-CM

## 2024-01-25 ENCOUNTER — TELEPHONE (OUTPATIENT)
Dept: SURGERY | Facility: CLINIC | Age: 54
End: 2024-01-25
Payer: COMMERCIAL

## 2024-01-25 NOTE — TELEPHONE ENCOUNTER
Received cards clearance. Called pt and told him to hold his ASA for 5 days before his surgery. He will hold starting this Sunday. He verbalized understanding

## 2024-01-30 PROBLEM — K40.20 NON-RECURRENT BILATERAL INGUINAL HERNIA WITHOUT OBSTRUCTION OR GANGRENE: Status: ACTIVE | Noted: 2024-01-30

## 2024-02-15 ENCOUNTER — OFFICE VISIT (OUTPATIENT)
Dept: SURGERY | Facility: CLINIC | Age: 54
End: 2024-02-15
Payer: COMMERCIAL

## 2024-02-15 VITALS
SYSTOLIC BLOOD PRESSURE: 123 MMHG | WEIGHT: 271.19 LBS | DIASTOLIC BLOOD PRESSURE: 82 MMHG | HEIGHT: 73 IN | BODY MASS INDEX: 35.94 KG/M2 | HEART RATE: 73 BPM

## 2024-02-15 DIAGNOSIS — Z01.818 PREOPERATIVE EXAMINATION: Primary | ICD-10-CM

## 2024-02-15 PROCEDURE — 99024 POSTOP FOLLOW-UP VISIT: CPT | Mod: ,,, | Performed by: NURSE PRACTITIONER

## 2024-02-15 PROCEDURE — 3044F HG A1C LEVEL LT 7.0%: CPT | Mod: CPTII,,, | Performed by: NURSE PRACTITIONER

## 2024-02-15 PROCEDURE — 1159F MED LIST DOCD IN RCRD: CPT | Mod: CPTII,,, | Performed by: NURSE PRACTITIONER

## 2024-02-15 PROCEDURE — 3079F DIAST BP 80-89 MM HG: CPT | Mod: CPTII,,, | Performed by: NURSE PRACTITIONER

## 2024-02-15 PROCEDURE — 3074F SYST BP LT 130 MM HG: CPT | Mod: CPTII,,, | Performed by: NURSE PRACTITIONER

## 2024-02-15 PROCEDURE — 4010F ACE/ARB THERAPY RXD/TAKEN: CPT | Mod: CPTII,,, | Performed by: NURSE PRACTITIONER

## 2024-02-15 NOTE — PROGRESS NOTES
Patient ID: [id number]     Chief Complaint: Post-op Evaluation (02/02/24 lap mamta inguinal hernia repair)    HPI:     Peter Hunt is a 53 y.o. male here today for postoperative visit of lap bilateral inguinal hernia repair on 2/2/2024. Pt's incisions are healing well, denies any abd pain. Does have some shooting pains occasionally when he does press on area that was repaired but otherwise much better than the first three days after surgery.  having normal bowel movements, passing flatus, no NVD. no constipation.     Patient Care Team:  LIONEL Aguila Jr., MD as PCP - General (Family Medicine)  Ankush Stephenson MD as Surgeon (General Surgery)  Iglesia Shaw MD as Consulting Physician (Cardiology)   Past Medical History:   Diagnosis Date    Allergy     Seasonal    Angina pectoris     Atherosclerosis of native arteries of extremities with intermittent claudication, unspecified extremity     HLD (hyperlipidemia)     HTN (hypertension)     Prediabetes     Vitamin D deficiency       Past Surgical History:   Procedure Laterality Date    BACK SURGERY      CLOSURE OF SKIN AND SUBCUTANEOUS TISSUE OTHER SITES (02/24/2013)      Drainage of scrotal wall abscess (03/27/2015)      Fluoroscopy of Multiple Coronary Arteries using Low Osmolar Contrast (10/20/2017)      HAND SURGERY      Incision and drainage of scrotum and tunica vaginalis (03/27/2015)      Measurement of Cardiac Sampling and Pressure, Left Heart, Percutaneous Approach (10/20/2017)      REPAIR, HERNIA, INGUINAL, LAPAROSCOPIC Bilateral 2/2/2024    Procedure: REPAIR, HERNIA, INGUINAL, LAPAROSCOPIC;  Surgeon: Ankush Stephenson MD;  Location: [facility];  Service: General;  Laterality: Bilateral;  Mamta lap inguinal hernia repair    Simple repair of superficial wounds of scalp, neck, axillae, external genitalia, trunk and/or extremities (including hands and feet); 2.5 cm or less. (02/24/2013)        Social History     Tobacco Use    Smoking status: Some Days  "    Types: Cigars    Smokeless tobacco: Never   Substance and Sexual Activity    Alcohol use: Yes     Comment: occasionally    Drug use: Never    Sexual activity: Yes     Partners: Female     Birth control/protection: None      Current Outpatient Medications   Medication Instructions    amLODIPine (NORVASC) 10 mg, Oral, Daily    aspirin (ECOTRIN) 81 mg, Oral    HYDROcodone-acetaminophen (NORCO) 7.5-325 mg per tablet 1 tablet, Oral, Every 6 hours PRN    rosuvastatin (CRESTOR) 5 mg, Oral, Daily    valsartan (DIOVAN) 160 mg, Oral, Daily     Review of patient's allergies indicates:  No Known Allergies     Subjective:     Review of Systems    12 point review of systems conducted, negative except as stated in the history of present illness. See HPI for details.    Objective:     Visit Vitals  /82 (BP Location: Left arm, Patient Position: Sitting)   Pulse 73   Ht 6' 1" (1.854 m)   Wt 123 kg (271 lb 3.2 oz)   BMI 35.78 kg/m²       Physical Exam:  General:  Alert and oriented.     Gastrointestinal:  Soft, Non-tender, Non-distended, Normal bowel sounds. Lap sites healing well, no redness, swelling, drainage, or foul odor. No hernia noted. Mild swelling to groin. No swelling to testicles noted.   Musculoskeletal:  Normal range of motion, Normal strength.    Integumentary:  Warm, Dry, Pink.    Neurologic:  Alert, Oriented.    Psychiatric:  Cooperative.      Assessment:       ICD-10-CM ICD-9-CM   1. Preoperative examination  Z01.818 V72.84        Plan:     1. Preoperative examination      - Incisions healing well   - ok to take adhesive off with adhesive remover   - ok to get in tub, swim, wash with any soap, and still no lifting >15# x's 2 weeks.  - ED precautions given to patient and is to call the office immediately or go to the emergency room if he notices any redness, swelling, severe pain, increase nausea/vomiting, fever, irregular bowel movements or severe diarrhea    No future appointments.       Shawna Redding, " FNP

## 2024-04-26 ENCOUNTER — HOSPITAL ENCOUNTER (EMERGENCY)
Facility: HOSPITAL | Age: 54
Discharge: HOME | End: 2024-04-26
Attending: EMERGENCY MEDICINE
Payer: COMMERCIAL

## 2024-04-26 VITALS
BODY MASS INDEX: 35.78 KG/M2 | WEIGHT: 270 LBS | OXYGEN SATURATION: 98 % | HEART RATE: 70 BPM | HEIGHT: 73 IN | DIASTOLIC BLOOD PRESSURE: 93 MMHG | TEMPERATURE: 98 F | SYSTOLIC BLOOD PRESSURE: 135 MMHG | RESPIRATION RATE: 16 BRPM

## 2024-04-26 DIAGNOSIS — Z91.09 ALLERGY TO ENVIRONMENTAL FACTORS: ICD-10-CM

## 2024-04-26 DIAGNOSIS — R42 VERTIGO: ICD-10-CM

## 2024-04-26 DIAGNOSIS — R42 DIZZINESS: Primary | ICD-10-CM

## 2024-04-26 PROCEDURE — 25000003 PHARM REV CODE 250: Performed by: PHYSICIAN ASSISTANT

## 2024-04-26 PROCEDURE — 99284 EMERGENCY DEPT VISIT MOD MDM: CPT

## 2024-04-26 RX ORDER — MECLIZINE HYDROCHLORIDE 25 MG/1
25 TABLET ORAL
Status: COMPLETED | OUTPATIENT
Start: 2024-04-26 | End: 2024-04-26

## 2024-04-26 RX ORDER — METOCLOPRAMIDE HYDROCHLORIDE 5 MG/ML
10 INJECTION INTRAMUSCULAR; INTRAVENOUS
Status: DISCONTINUED | OUTPATIENT
Start: 2024-04-26 | End: 2024-04-26

## 2024-04-26 RX ORDER — ACETAMINOPHEN 10 MG/ML
1000 INJECTION, SOLUTION INTRAVENOUS ONCE
Status: DISCONTINUED | OUTPATIENT
Start: 2024-04-26 | End: 2024-04-26

## 2024-04-26 RX ORDER — LORATADINE 10 MG/1
10 TABLET ORAL DAILY
Qty: 30 TABLET | Refills: 0 | Status: SHIPPED | OUTPATIENT
Start: 2024-04-26 | End: 2024-05-26

## 2024-04-26 RX ORDER — MECLIZINE HYDROCHLORIDE 25 MG/1
25 TABLET ORAL 3 TIMES DAILY PRN
Qty: 30 TABLET | Refills: 0 | Status: SHIPPED | OUTPATIENT
Start: 2024-04-26

## 2024-04-26 RX ADMIN — MECLIZINE HYDROCHLORIDE 25 MG: 25 TABLET ORAL at 11:04

## 2024-04-27 NOTE — ED PROVIDER NOTES
Encounter Date: 4/26/2024       History     Chief Complaint   Patient presents with    Dizziness     Pt. C/o dizziness x 3 days. Pt. States hx of vertigo, and that this feels like previous episode of vertigo in past. Denies medication use pta. Aaox4, no motor deficits noted. Denies recent illness, states meclizine has worked in the past.     Patient presents with:  Dizziness: Pt. C/o dizziness x 3 days. Pt. States hx of vertigo, and that this feels like previous episode of vertigo in past. Denies medication use pta. Aaox4, no motor deficits noted. Denies recent illness, states meclizine has worked in the past.            Review of patient's allergies indicates:  No Known Allergies  Past Medical History:   Diagnosis Date    Allergy     Seasonal    Angina pectoris     Atherosclerosis of native arteries of extremities with intermittent claudication, unspecified extremity     HLD (hyperlipidemia)     HTN (hypertension)     Prediabetes     Vitamin D deficiency      Past Surgical History:   Procedure Laterality Date    BACK SURGERY      CLOSURE OF SKIN AND SUBCUTANEOUS TISSUE OTHER SITES (02/24/2013)      Drainage of scrotal wall abscess (03/27/2015)      Fluoroscopy of Multiple Coronary Arteries using Low Osmolar Contrast (10/20/2017)      HAND SURGERY      Incision and drainage of scrotum and tunica vaginalis (03/27/2015)      Measurement of Cardiac Sampling and Pressure, Left Heart, Percutaneous Approach (10/20/2017)      REPAIR, HERNIA, INGUINAL, LAPAROSCOPIC Bilateral 2/2/2024    Procedure: REPAIR, HERNIA, INGUINAL, LAPAROSCOPIC;  Surgeon: Ankush Stephenson MD;  Location: [facility] OR;  Service: General;  Laterality: Bilateral;  Estuardo lap inguinal hernia repair    Simple repair of superficial wounds of scalp, neck, axillae, external genitalia, trunk and/or extremities (including hands and feet); 2.5 cm or less. (02/24/2013)       Family History   Problem Relation Name Age of Onset    Hypertension Mother      Coronary  artery disease Father      Kidney disease Father      Hypertension Sister Cheyenne hunt     Hypertension Brother Girma hunt     Hypertension Brother José hunt     Hyperlipidemia Son Jf Hunt      Social History     Tobacco Use    Smoking status: Some Days     Types: Cigars    Smokeless tobacco: Never   Substance Use Topics    Alcohol use: Yes     Comment: occasionally    Drug use: Never     Review of Systems   Constitutional:  Negative for fever.   HENT:  Negative for sore throat.    Respiratory:  Negative for shortness of breath.    Cardiovascular:  Negative for chest pain.   Gastrointestinal:  Negative for nausea.   Genitourinary:  Negative for dysuria.   Musculoskeletal:  Negative for back pain.   Skin:  Negative for rash.   Neurological:  Positive for dizziness. Negative for weakness.   Hematological:  Does not bruise/bleed easily.       Physical Exam     Initial Vitals [04/26/24 2127]   BP Pulse Resp Temp SpO2   (!) 135/93 70 16 98 °F (36.7 °C) 98 %      MAP       --         Physical Exam    Vitals reviewed.  Constitutional: He appears well-developed and well-nourished.   HENT:   Right Ear: Tympanic membrane is not erythematous, not retracted and not bulging.   Left Ear: Tympanic membrane is not erythematous, not retracted and not bulging.   Nose: Nose normal. Right sinus exhibits no maxillary sinus tenderness and no frontal sinus tenderness. Left sinus exhibits no maxillary sinus tenderness and no frontal sinus tenderness.   Mouth/Throat: Uvula is midline, oropharynx is clear and moist and mucous membranes are normal.   Eyes: Pupils are equal, round, and reactive to light.   Cardiovascular:  Normal rate.           Abdominal: Abdomen is soft.   Musculoskeletal:         General: Normal range of motion.     Neurological: He is alert and oriented to person, place, and time. He has normal strength. GCS eye subscore is 4. GCS verbal subscore is 5. GCS motor subscore is 6.   Skin: Skin is warm.    Psychiatric: He has a normal mood and affect. His behavior is normal. Judgment and thought content normal.         ED Course   Procedures  Labs Reviewed - No data to display       Imaging Results    None          Medications   meclizine tablet 25 mg (has no administration in time range)     Medical Decision Making  Patient presents with:  Dizziness: Pt. C/o dizziness x 3 days. Pt. States hx of vertigo, and that this feels like previous episode of vertigo in past. Denies medication use pta. Aaox4, no motor deficits noted. Denies recent illness, states meclizine has worked in the past.        Amount and/or Complexity of Data Reviewed  Discussion of management or test interpretation with external provider(s): Refilled meclizine, patient has taken meclizine before and it has worked.  I am also going to give him a short course of antihistamine such as Claritin to help him with his environmental allergy symptoms      Risk  OTC drugs.  Prescription drug management.    Judging by the patient's chief complaint and pertinent history, the patient has the following possible differential diagnoses, including but not limited to the following.  Some of these are deemed to be lower likelihood and some more likely based on my physical exam and history combined with possible lab work and/or imaging studies.   Please see the pertinent studies, and refer to the HPI.  Some of these diagnoses will take further evaluation to fully rule out, perhaps as an outpatient and the patient was encouraged to follow up when discharged for more comprehensive evaluation.    Vertigo, sinusitis, cervicalgia.                 The patient is resting comfortably in no acute distress.  He is hemodynamically stable and is without objective evidence for acute process requiring urgent intervention or hospitalization. I provided counseling to patient with regard to condition, the treatment plan, specific conditions for return, and the importance of follow up.  Detailed written and verbal instructions provided to patient and he expressed a verbal understanding of the discharge instructions and overall management plan. Reiterated the importance of medication administration and safety and advised patient to follow up with primary care provider in 3-5 days or sooner if needed.  Answered questions at this time. The patient is stable for discharge.                     Clinical Impression:  Final diagnoses:  [R42] Dizziness (Primary)  [R42] Vertigo  [Z91.09] Allergy to environmental factors          ED Disposition Condition    Discharge Stable          ED Prescriptions       Medication Sig Dispense Start Date End Date Auth. Provider    meclizine (ANTIVERT) 25 mg tablet Take 1 tablet (25 mg total) by mouth 3 (three) times daily as needed for Dizziness. 30 tablet 4/26/2024 -- Alanna Hinojosa PA    loratadine (CLARITIN) 10 mg tablet Take 1 tablet (10 mg total) by mouth once daily. 30 tablet 4/26/2024 5/26/2024 Alanna Hinojosa PA          Follow-up Information       Follow up With Specialties Details Why Contact Info    [facility] - Emergency Dept Emergency Medicine  If symptoms worsen [address] [place] [id number] [phone]    LIONEL Aguila Jr., MD Family Medicine  If symptoms worsen [address] [facility] [address]  [phone]               Alanna Hinojosa PA  04/26/24 4587

## 2024-05-24 ENCOUNTER — HOSPITAL ENCOUNTER (EMERGENCY)
Facility: HOSPITAL | Age: 54
Discharge: HOME | End: 2024-05-25
Attending: EMERGENCY MEDICINE
Payer: COMMERCIAL

## 2024-05-24 DIAGNOSIS — S39.012A STRAIN OF LUMBAR REGION, INITIAL ENCOUNTER: ICD-10-CM

## 2024-05-24 DIAGNOSIS — S16.1XXA STRAIN OF NECK MUSCLE, INITIAL ENCOUNTER: ICD-10-CM
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
## 2024-05-24 PROCEDURE — 99285 EMERGENCY DEPT VISIT HI MDM: CPT | Mod: 25

## 2024-05-24 PROCEDURE — 25000003 PHARM REV CODE 250: Performed by: EMERGENCY MEDICINE

## 2024-05-24 PROCEDURE — 96372 THER/PROPH/DIAG INJ SC/IM: CPT | Performed by: EMERGENCY MEDICINE

## 2024-05-24 PROCEDURE — 63600175 PHARM REV CODE 636 W HCPCS: Performed by: EMERGENCY MEDICINE

## 2024-05-24 RX ORDER — INDOMETHACIN 50 MG/1
50 CAPSULE ORAL
Qty: 15 CAPSULE | Refills: 0 | Status: SHIPPED | OUTPATIENT
Start: 2024-05-24

## 2024-05-24 RX ORDER — KETOROLAC TROMETHAMINE 30 MG/ML
60 INJECTION, SOLUTION INTRAMUSCULAR; INTRAVENOUS
Status: COMPLETED | OUTPATIENT
Start: 2024-05-24 | End: 2024-05-24

## 2024-05-24 RX ORDER — HYDROCODONE BITARTRATE AND ACETAMINOPHEN 10; 325 MG/1; MG/1
1 TABLET ORAL
Status: COMPLETED | OUTPATIENT
Start: 2024-05-24 | End: 2024-05-24

## 2024-05-24 RX ADMIN — KETOROLAC TROMETHAMINE 60 MG: 30 INJECTION, SOLUTION INTRAMUSCULAR at 11:05

## 2024-05-24 RX ADMIN — HYDROCODONE BITARTRATE AND ACETAMINOPHEN 1 TABLET: 10; 325 TABLET ORAL at 11:05

## 2024-05-25 VITALS
BODY MASS INDEX: 35.78 KG/M2 | OXYGEN SATURATION: 100 % | HEIGHT: 73 IN | WEIGHT: 270 LBS | SYSTOLIC BLOOD PRESSURE: 118 MMHG | RESPIRATION RATE: 19 BRPM | DIASTOLIC BLOOD PRESSURE: 85 MMHG | HEART RATE: 77 BPM | TEMPERATURE: 98 F
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
## 2025-03-04 ENCOUNTER — HOSPITAL ENCOUNTER (EMERGENCY)
Facility: HOSPITAL | Age: 55
Discharge: HOME | End: 2025-03-05
Attending: STUDENT IN AN ORGANIZED HEALTH CARE EDUCATION/TRAINING PROGRAM
Payer: COMMERCIAL

## 2025-03-04 DIAGNOSIS — S39.93XA BLUNT TRAUMATIC INJURY OF THORACO-ABDOMINO-PELVIC REGION: ICD-10-CM

## 2025-03-04 DIAGNOSIS — T14.90XA TRAUMA: ICD-10-CM

## 2025-03-04 DIAGNOSIS — S29.9XXA BLUNT TRAUMATIC INJURY OF THORACO-ABDOMINO-PELVIC REGION: ICD-10-CM
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
## 2025-03-04 DIAGNOSIS — S09.90XA CLOSED HEAD INJURY, INITIAL ENCOUNTER: ICD-10-CM

## 2025-03-04 DIAGNOSIS — S39.91XA BLUNT TRAUMATIC INJURY OF THORACO-ABDOMINO-PELVIC REGION: ICD-10-CM

## 2025-03-04 PROCEDURE — 99285 EMERGENCY DEPT VISIT HI MDM: CPT | Mod: 25

## 2025-03-04 PROCEDURE — 96374 THER/PROPH/DIAG INJ IV PUSH: CPT

## 2025-03-05 VITALS
RESPIRATION RATE: 20 BRPM | HEART RATE: 70 BPM | DIASTOLIC BLOOD PRESSURE: 99 MMHG | HEIGHT: 73 IN | SYSTOLIC BLOOD PRESSURE: 149 MMHG | TEMPERATURE: 98 F | OXYGEN SATURATION: 100 % | WEIGHT: 275 LBS | BODY MASS INDEX: 36.45 KG/M2

## 2025-03-05 LAB
ABORH RETYPE: NORMAL
ACCEPTIBLE SP GR UR QL: 1.04 (ref 1–1.03)
ALBUMIN SERPL-MCNC: 4.1 G/DL (ref 3.5–5)
ALBUMIN/GLOB SERPL: 1.1 RATIO (ref 1.1–2)
ALP SERPL-CCNC: 51 UNIT/L (ref 40–150)
ALT SERPL-CCNC: 15 UNIT/L (ref 0–55)
AMPHET UR QL SCN: NEGATIVE
ANION GAP SERPL CALC-SCNC: 10 MEQ/L
APTT PPP: 27.3 SECONDS (ref 23.2–33.7)
AST SERPL-CCNC: 23 UNIT/L (ref 5–34)
BACTERIA #/AREA URNS AUTO: ABNORMAL /HPF
BARBITURATE SCN PRESENT UR: NEGATIVE
BASOPHILS # BLD AUTO: 0.05 X10(3)/MCL
BASOPHILS NFR BLD AUTO: 0.7 %
BENZODIAZ UR QL SCN: NEGATIVE
BILIRUB SERPL-MCNC: 0.7 MG/DL
BILIRUB UR QL STRIP.AUTO: NEGATIVE
BUN SERPL-MCNC: 17.8 MG/DL (ref 8.4–25.7)
CALCIUM SERPL-MCNC: 9.1 MG/DL (ref 8.4–10.2)
CANNABINOIDS UR QL SCN: NEGATIVE
CHLORIDE SERPL-SCNC: 111 MMOL/L (ref 98–107)
CLARITY UR: CLEAR
CO2 SERPL-SCNC: 22 MMOL/L (ref 22–29)
COCAINE UR QL SCN: NEGATIVE
COLOR UR AUTO: ABNORMAL
CREAT SERPL-MCNC: 1.05 MG/DL (ref 0.72–1.25)
CREAT/UREA NIT SERPL: 17
EOSINOPHIL # BLD AUTO: 0.52 X10(3)/MCL (ref 0–0.9)
EOSINOPHIL NFR BLD AUTO: 7.6 %
ERYTHROCYTE [DISTWIDTH] IN BLOOD BY AUTOMATED COUNT: 14.1 % (ref 11.5–17)
ETHANOL SERPL-MCNC: <10 MG/DL
FENTANYL UR QL SCN: NEGATIVE
GFR SERPLBLD CREATININE-BSD FMLA CKD-EPI: >60 ML/MIN/1.73/M2
GLOBULIN SER-MCNC: 3.9 GM/DL (ref 2.4–3.5)
GLUCOSE SERPL-MCNC: 137 MG/DL (ref 74–100)
GLUCOSE UR QL STRIP: ABNORMAL
GROUP & RH: NORMAL
HCT VFR BLD AUTO: 43.7 % (ref 42–52)
HGB BLD-MCNC: 14.5 G/DL (ref 14–18)
HGB UR QL STRIP: ABNORMAL
IMM GRANULOCYTES # BLD AUTO: 0.02 X10(3)/MCL (ref 0–0.04)
IMM GRANULOCYTES NFR BLD AUTO: 0.3 %
INDIRECT COOMBS: NORMAL
INR PPP: 1
KETONES UR QL STRIP: NEGATIVE
LEUKOCYTE ESTERASE UR QL STRIP: NEGATIVE
LYMPHOCYTES # BLD AUTO: 2.45 X10(3)/MCL (ref 0.6–4.6)
LYMPHOCYTES NFR BLD AUTO: 35.7 %
MCH RBC QN AUTO: 29.7 PG (ref 27–31)
MCHC RBC AUTO-ENTMCNC: 33.2 G/DL (ref 33–36)
MCV RBC AUTO: 89.5 FL (ref 80–94)
MDMA UR QL SCN: NEGATIVE
MONOCYTES # BLD AUTO: 0.62 X10(3)/MCL (ref 0.1–1.3)
MONOCYTES NFR BLD AUTO: 9 %
MUCOUS THREADS URNS QL MICRO: ABNORMAL /LPF
NEUTROPHILS # BLD AUTO: 3.21 X10(3)/MCL (ref 2.1–9.2)
NEUTROPHILS NFR BLD AUTO: 46.7 %
NITRITE UR QL STRIP: NEGATIVE
NRBC BLD AUTO-RTO: 0 %
OPIATES UR QL SCN: NEGATIVE
PCP UR QL: NEGATIVE
PH UR STRIP: 5.5 [PH]
PH UR: 5.5 [PH] (ref 3–11)
PLATELET # BLD AUTO: 228 X10(3)/MCL (ref 130–400)
PMV BLD AUTO: 8.9 FL (ref 7.4–10.4)
POTASSIUM SERPL-SCNC: 3.6 MMOL/L (ref 3.5–5.1)
PROT SERPL-MCNC: 8 GM/DL (ref 6.4–8.3)
PROT UR QL STRIP: NEGATIVE
PROTHROMBIN TIME: 13.1 SECONDS (ref 12.5–14.5)
RBC # BLD AUTO: 4.88 X10(6)/MCL (ref 4.7–6.1)
RBC #/AREA URNS AUTO: ABNORMAL /HPF
SODIUM SERPL-SCNC: 143 MMOL/L (ref 136–145)
SP GR UR STRIP.AUTO: 1.04 (ref 1–1.03)
SPECIMEN OUTDATE: NORMAL
SQUAMOUS #/AREA URNS LPF: ABNORMAL /HPF
UROBILINOGEN UR STRIP-ACNC: NORMAL
WBC # BLD AUTO: 6.87 X10(3)/MCL (ref 4.5–11.5)
WBC #/AREA URNS AUTO: ABNORMAL /HPF

## 2025-03-05 PROCEDURE — 85025 COMPLETE CBC W/AUTO DIFF WBC: CPT | Performed by: STUDENT IN AN ORGANIZED HEALTH CARE EDUCATION/TRAINING PROGRAM

## 2025-03-05 PROCEDURE — 63600175 PHARM REV CODE 636 W HCPCS: Mod: JZ,TB | Performed by: STUDENT IN AN ORGANIZED HEALTH CARE EDUCATION/TRAINING PROGRAM

## 2025-03-05 PROCEDURE — 85610 PROTHROMBIN TIME: CPT | Performed by: STUDENT IN AN ORGANIZED HEALTH CARE EDUCATION/TRAINING PROGRAM

## 2025-03-05 PROCEDURE — 85730 THROMBOPLASTIN TIME PARTIAL: CPT | Performed by: STUDENT IN AN ORGANIZED HEALTH CARE EDUCATION/TRAINING PROGRAM

## 2025-03-05 PROCEDURE — 80307 DRUG TEST PRSMV CHEM ANLYZR: CPT | Performed by: STUDENT IN AN ORGANIZED HEALTH CARE EDUCATION/TRAINING PROGRAM

## 2025-03-05 PROCEDURE — 82077 ASSAY SPEC XCP UR&BREATH IA: CPT | Performed by: STUDENT IN AN ORGANIZED HEALTH CARE EDUCATION/TRAINING PROGRAM

## 2025-03-05 PROCEDURE — 25500020 PHARM REV CODE 255: Performed by: STUDENT IN AN ORGANIZED HEALTH CARE EDUCATION/TRAINING PROGRAM

## 2025-03-05 PROCEDURE — 36415 COLL VENOUS BLD VENIPUNCTURE: CPT | Performed by: STUDENT IN AN ORGANIZED HEALTH CARE EDUCATION/TRAINING PROGRAM

## 2025-03-05 PROCEDURE — 86901 BLOOD TYPING SEROLOGIC RH(D): CPT | Performed by: STUDENT IN AN ORGANIZED HEALTH CARE EDUCATION/TRAINING PROGRAM

## 2025-03-05 PROCEDURE — 81001 URINALYSIS AUTO W/SCOPE: CPT | Performed by: STUDENT IN AN ORGANIZED HEALTH CARE EDUCATION/TRAINING PROGRAM

## 2025-03-05 PROCEDURE — 80053 COMPREHEN METABOLIC PANEL: CPT | Performed by: STUDENT IN AN ORGANIZED HEALTH CARE EDUCATION/TRAINING PROGRAM

## 2025-03-05 RX ORDER — ONDANSETRON 4 MG/1
4 TABLET, ORALLY DISINTEGRATING ORAL EVERY 6 HOURS PRN
Qty: 12 TABLET | Refills: 0 | Status: SHIPPED | OUTPATIENT
Start: 2025-03-05

## 2025-03-05 RX ORDER — METHOCARBAMOL 100 MG/ML
1000 INJECTION, SOLUTION INTRAMUSCULAR; INTRAVENOUS ONCE
Status: DISCONTINUED | OUTPATIENT
Start: 2025-03-05 | End: 2025-03-05 | Stop reason: HOSPADM

## 2025-03-05 RX ORDER — KETOROLAC TROMETHAMINE 30 MG/ML
15 INJECTION, SOLUTION INTRAMUSCULAR; INTRAVENOUS
Status: COMPLETED | OUTPATIENT
Start: 2025-03-05 | End: 2025-03-05

## 2025-03-05 RX ORDER — METHOCARBAMOL 500 MG/1
1000 TABLET, FILM COATED ORAL 3 TIMES DAILY
Qty: 30 TABLET | Refills: 0 | Status: SHIPPED | OUTPATIENT
Start: 2025-03-05 | End: 2025-03-10

## 2025-03-05 RX ORDER — IBUPROFEN 600 MG/1
600 TABLET ORAL EVERY 6 HOURS PRN
Qty: 20 TABLET | Refills: 0 | Status: SHIPPED | OUTPATIENT
Start: 2025-03-05

## 2025-03-05 RX ADMIN — KETOROLAC TROMETHAMINE 15 MG: 30 INJECTION, SOLUTION INTRAMUSCULAR; INTRAVENOUS at 02:03

## 2025-03-05 RX ADMIN — SODIUM CHLORIDE, POTASSIUM CHLORIDE, SODIUM LACTATE AND CALCIUM CHLORIDE 1000 ML: 600; 310; 30; 20 INJECTION, SOLUTION INTRAVENOUS at 02:03

## 2025-03-05 RX ADMIN — IOHEXOL 100 ML: 350 INJECTION, SOLUTION INTRAVENOUS at 12:03
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
## 2025-03-05 NOTE — DISCHARGE INSTRUCTIONS

## 2025-06-24 PROBLEM — E66.01 CLASS 2 SEVERE OBESITY DUE TO EXCESS CALORIES WITH SERIOUS COMORBIDITY AND BODY MASS INDEX (BMI) OF 37.0 TO 37.9 IN ADULT: Status: ACTIVE | Noted: 2023-03-08

## 2025-06-24 PROBLEM — E66.812 CLASS 2 SEVERE OBESITY DUE TO EXCESS CALORIES WITH SERIOUS COMORBIDITY AND BODY MASS INDEX (BMI) OF 37.0 TO 37.9 IN ADULT: Status: ACTIVE | Noted: 2023-03-08

## 2025-08-14 ENCOUNTER — HOSPITAL ENCOUNTER (EMERGENCY)
Facility: HOSPITAL | Age: 55
Discharge: HOME | End: 2025-08-14
Attending: EMERGENCY MEDICINE
Payer: COMMERCIAL

## 2025-08-14 VITALS
SYSTOLIC BLOOD PRESSURE: 130 MMHG | BODY MASS INDEX: 36.84 KG/M2 | HEIGHT: 73 IN | RESPIRATION RATE: 18 BRPM | DIASTOLIC BLOOD PRESSURE: 83 MMHG | OXYGEN SATURATION: 99 % | WEIGHT: 278 LBS | TEMPERATURE: 100 F | HEART RATE: 89 BPM

## 2025-08-14 DIAGNOSIS — U07.1 COVID-19: Primary | ICD-10-CM

## 2025-08-14 LAB
FLUAV AG UPPER RESP QL IA.RAPID: NOT DETECTED
FLUBV AG UPPER RESP QL IA.RAPID: NOT DETECTED
SARS-COV-2 RNA RESP QL NAA+PROBE: DETECTED
STREP A PCR (OHS): NOT DETECTED

## 2025-08-14 PROCEDURE — 87636 SARSCOV2 & INF A&B AMP PRB: CPT

## 2025-08-14 PROCEDURE — 25000003 PHARM REV CODE 250

## 2025-08-14 PROCEDURE — 87651 STREP A DNA AMP PROBE: CPT

## 2025-08-14 PROCEDURE — 99284 EMERGENCY DEPT VISIT MOD MDM: CPT

## 2025-08-14 RX ORDER — IBUPROFEN 600 MG/1
600 TABLET, FILM COATED ORAL EVERY 6 HOURS PRN
Qty: 20 TABLET | Refills: 0 | Status: SHIPPED | OUTPATIENT
Start: 2025-08-14

## 2025-08-14 RX ORDER — ALBUTEROL SULFATE 90 UG/1
1-2 INHALANT RESPIRATORY (INHALATION) EVERY 6 HOURS PRN
Qty: 18 G | Refills: 0 | Status: SHIPPED | OUTPATIENT
Start: 2025-08-14

## 2025-08-14 RX ORDER — ACETAMINOPHEN 500 MG
1000 TABLET ORAL
Status: DISCONTINUED | OUTPATIENT
Start: 2025-08-14 | End: 2025-08-14

## 2025-08-14 RX ORDER — LORATADINE 10 MG/1
10 TABLET ORAL DAILY PRN
Qty: 20 TABLET | Refills: 0 | Status: SHIPPED | OUTPATIENT
Start: 2025-08-14

## 2025-08-14 RX ORDER — FLUTICASONE PROPIONATE 50 MCG
1 SPRAY, SUSPENSION (ML) NASAL 2 TIMES DAILY PRN
Qty: 15 G | Refills: 0 | Status: SHIPPED | OUTPATIENT
Start: 2025-08-14

## 2025-08-14 RX ORDER — IBUPROFEN 600 MG/1
600 TABLET, FILM COATED ORAL
Status: COMPLETED | OUTPATIENT
Start: 2025-08-14 | End: 2025-08-14

## 2025-08-14 RX ADMIN — IBUPROFEN 600 MG: 600 TABLET ORAL at 01:08
